# Patient Record
Sex: FEMALE | Race: BLACK OR AFRICAN AMERICAN | Employment: FULL TIME | URBAN - METROPOLITAN AREA
[De-identification: names, ages, dates, MRNs, and addresses within clinical notes are randomized per-mention and may not be internally consistent; named-entity substitution may affect disease eponyms.]

---

## 2018-10-24 ENCOUNTER — HOSPITAL ENCOUNTER (EMERGENCY)
Age: 23
Discharge: HOME | End: 2018-10-25

## 2018-10-24 VITALS
SYSTOLIC BLOOD PRESSURE: 129 MMHG | WEIGHT: 103 LBS | BODY MASS INDEX: 18.25 KG/M2 | RESPIRATION RATE: 16 BRPM | TEMPERATURE: 98.6 F | OXYGEN SATURATION: 100 % | DIASTOLIC BLOOD PRESSURE: 78 MMHG | HEART RATE: 88 BPM | HEIGHT: 63 IN

## 2018-10-24 DIAGNOSIS — J06.9 ACUTE UPPER RESPIRATORY INFECTION: Primary | ICD-10-CM

## 2018-10-24 PROCEDURE — 99282 EMERGENCY DEPT VISIT SF MDM: CPT

## 2018-10-24 RX ORDER — ACETAMINOPHEN 500 MG
500 TABLET ORAL EVERY 6 HOURS PRN
Qty: 120 TABLET | Refills: 3 | Status: SHIPPED | OUTPATIENT
Start: 2018-10-24 | End: 2021-07-02

## 2018-10-24 RX ORDER — FLUTICASONE PROPIONATE 50 MCG
1 SPRAY, SUSPENSION (ML) NASAL 2 TIMES DAILY
Qty: 1 BOTTLE | Refills: 0 | Status: SHIPPED | OUTPATIENT
Start: 2018-10-24 | End: 2019-02-17 | Stop reason: ALTCHOICE

## 2018-10-24 RX ORDER — IBUPROFEN 600 MG/1
600 TABLET ORAL EVERY 6 HOURS PRN
Qty: 120 TABLET | Refills: 3 | Status: SHIPPED | OUTPATIENT
Start: 2018-10-24 | End: 2019-02-17 | Stop reason: ALTCHOICE

## 2018-10-24 RX ORDER — GUAIFENESIN/DEXTROMETHORPHAN 100-10MG/5
5 SYRUP ORAL 3 TIMES DAILY PRN
Qty: 120 ML | Refills: 0 | Status: SHIPPED | OUTPATIENT
Start: 2018-10-24 | End: 2018-11-03

## 2018-10-24 ASSESSMENT — PAIN DESCRIPTION - DESCRIPTORS: DESCRIPTORS: SHARP

## 2018-10-24 ASSESSMENT — PAIN SCALES - GENERAL: PAINLEVEL_OUTOF10: 5

## 2018-10-24 ASSESSMENT — PAIN DESCRIPTION - LOCATION: LOCATION: THROAT

## 2018-10-24 ASSESSMENT — PAIN DESCRIPTION - PAIN TYPE: TYPE: ACUTE PAIN

## 2018-10-24 ASSESSMENT — PAIN DESCRIPTION - FREQUENCY: FREQUENCY: INTERMITTENT

## 2018-10-25 ASSESSMENT — ENCOUNTER SYMPTOMS
VOMITING: 0
RHINORRHEA: 1
VOICE CHANGE: 1
SHORTNESS OF BREATH: 0
TROUBLE SWALLOWING: 0
SORE THROAT: 1

## 2018-10-25 NOTE — ED PROVIDER NOTES
pooling of secretions. Eyes: Conjunctivae are normal. Right eye exhibits no discharge. Left eye exhibits no discharge. Neck: Neck supple. No nuchal rigidity. Cardiovascular: Normal rate, regular rhythm and normal heart sounds. Pulmonary/Chest: Effort normal and breath sounds normal. No respiratory distress. She has no wheezes. She has no rales. Speaking in full sentences without difficulty. No drooling or stridor. Abdominal: Soft. Musculoskeletal: She exhibits no edema. No LE edema. Lymphadenopathy:     She has no cervical adenopathy. Neurological: She is alert and oriented to person, place, and time. Skin: Skin is warm and dry. Psychiatric: She has a normal mood and affect. Her behavior is normal.   Nursing note and vitals reviewed. Diagnostic Results     RADIOLOGY:  No orders to display     LABS:   No results found for this visit on 10/24/18. RECENT VITALS:  BP: 129/78, Temp: 98.6 °F (37 °C), Pulse: 88, Resp: 16, SpO2: 100 %     Procedures     None    ED Course     Nursing Notes, Past Medical Hx,Past Surgical Hx, Social Hx, Allergies, and Family Hx were reviewed.     The patient was given the following medications:  Orders Placed This Encounter   Medications    acetaminophen (APAP EXTRA STRENGTH) 500 MG tablet     Sig: Take 1 tablet by mouth every 6 hours as needed for Pain     Dispense:  120 tablet     Refill:  3    ibuprofen (ADVIL;MOTRIN) 600 MG tablet     Sig: Take 1 tablet by mouth every 6 hours as needed for Pain     Dispense:  120 tablet     Refill:  3    fluticasone (FLONASE) 50 MCG/ACT nasal spray     Si spray by Nasal route 2 times daily     Dispense:  1 Bottle     Refill:  0    guaiFENesin-dextromethorphan (ROBITUSSIN DM) 100-10 MG/5ML syrup     Sig: Take 5 mLs by mouth 3 times daily as needed for Cough     Dispense:  120 mL     Refill:  0     CONSULTS:  None    MEDICAL DECISION MAKING / ASSESSMENT / Danilo Carmen is a 26 y.o. female here for sore

## 2018-10-25 NOTE — ED NOTES
Pt presents to the ED from home with c/o cough and sore throat since Saturday, pt denies any sob, chest pain. Pt denies taking any otc medications.       Marika Mariscal RN  10/25/18 5030

## 2019-02-17 ENCOUNTER — HOSPITAL ENCOUNTER (EMERGENCY)
Age: 24
Discharge: HOME | End: 2019-02-17
Attending: EMERGENCY MEDICINE
Payer: MEDICARE

## 2019-02-17 VITALS
OXYGEN SATURATION: 100 % | WEIGHT: 115 LBS | TEMPERATURE: 98.6 F | HEIGHT: 64 IN | HEART RATE: 106 BPM | BODY MASS INDEX: 19.63 KG/M2 | DIASTOLIC BLOOD PRESSURE: 66 MMHG | SYSTOLIC BLOOD PRESSURE: 108 MMHG | RESPIRATION RATE: 12 BRPM

## 2019-02-17 DIAGNOSIS — O99.891 ASYMPTOMATIC BACTERIURIA DURING PREGNANCY IN FIRST TRIMESTER: ICD-10-CM

## 2019-02-17 DIAGNOSIS — R82.71 ASYMPTOMATIC BACTERIURIA DURING PREGNANCY IN FIRST TRIMESTER: ICD-10-CM

## 2019-02-17 DIAGNOSIS — A59.03 TRICHOMONIASIS OF BLADDER: ICD-10-CM

## 2019-02-17 DIAGNOSIS — Z34.90 PREGNANCY, UNSPECIFIED GESTATIONAL AGE: ICD-10-CM

## 2019-02-17 DIAGNOSIS — M54.50 ACUTE RIGHT-SIDED LOW BACK PAIN WITHOUT SCIATICA: Primary | ICD-10-CM

## 2019-02-17 LAB
BACTERIA: ABNORMAL /HPF
BILIRUBIN URINE: NEGATIVE
BLOOD, URINE: NEGATIVE
CLARITY: CLEAR
COLOR: ABNORMAL
EPITHELIAL CELLS, UA: ABNORMAL /HPF
GLUCOSE URINE: NEGATIVE MG/DL
KETONES, URINE: NEGATIVE MG/DL
LEUKOCYTE ESTERASE, URINE: ABNORMAL
MICROSCOPIC EXAMINATION: YES
MUCUS: ABNORMAL /LPF
NITRITE, URINE: NEGATIVE
PH UA: 7
PROTEIN UA: NEGATIVE MG/DL
RBC UA: ABNORMAL /HPF (ref 0–2)
SPECIFIC GRAVITY UA: 1.01
TRICHOMONAS: PRESENT /HPF
URINE TYPE: ABNORMAL
UROBILINOGEN, URINE: >=8 E.U./DL
WBC UA: ABNORMAL /HPF (ref 0–5)

## 2019-02-17 PROCEDURE — 81001 URINALYSIS AUTO W/SCOPE: CPT

## 2019-02-17 PROCEDURE — 99283 EMERGENCY DEPT VISIT LOW MDM: CPT

## 2019-02-17 PROCEDURE — 6370000000 HC RX 637 (ALT 250 FOR IP): Performed by: EMERGENCY MEDICINE

## 2019-02-17 RX ORDER — ONDANSETRON 4 MG/1
4 TABLET, ORALLY DISINTEGRATING ORAL ONCE
Status: COMPLETED | OUTPATIENT
Start: 2019-02-17 | End: 2019-02-17

## 2019-02-17 RX ORDER — CEPHALEXIN 500 MG/1
500 CAPSULE ORAL 2 TIMES DAILY
Qty: 10 CAPSULE | Refills: 0 | Status: SHIPPED | OUTPATIENT
Start: 2019-02-17 | End: 2019-02-22

## 2019-02-17 RX ORDER — METRONIDAZOLE 500 MG/1
2000 TABLET ORAL ONCE
Status: COMPLETED | OUTPATIENT
Start: 2019-02-17 | End: 2019-02-17

## 2019-02-17 RX ORDER — PNV NO.95/FERROUS FUM/FOLIC AC 28MG-0.8MG
1 TABLET ORAL DAILY
Qty: 30 TABLET | Refills: 0 | Status: SHIPPED | OUTPATIENT
Start: 2019-02-17 | End: 2021-07-02

## 2019-02-17 RX ADMIN — METRONIDAZOLE 2000 MG: 500 TABLET ORAL at 15:58

## 2019-02-17 RX ADMIN — ONDANSETRON 4 MG: 4 TABLET, ORALLY DISINTEGRATING ORAL at 15:58

## 2019-02-25 ENCOUNTER — HOSPITAL ENCOUNTER (EMERGENCY)
Age: 24
Discharge: HOME | End: 2019-02-25
Attending: EMERGENCY MEDICINE
Payer: MEDICARE

## 2019-02-25 VITALS
OXYGEN SATURATION: 100 % | HEIGHT: 64 IN | TEMPERATURE: 98.2 F | WEIGHT: 117 LBS | HEART RATE: 86 BPM | SYSTOLIC BLOOD PRESSURE: 104 MMHG | BODY MASS INDEX: 19.97 KG/M2 | DIASTOLIC BLOOD PRESSURE: 64 MMHG | RESPIRATION RATE: 16 BRPM

## 2019-02-25 DIAGNOSIS — B37.31 YEAST VAGINITIS: Primary | ICD-10-CM

## 2019-02-25 DIAGNOSIS — Z34.91 FIRST TRIMESTER PREGNANCY: ICD-10-CM

## 2019-02-25 DIAGNOSIS — N30.00 ACUTE CYSTITIS WITHOUT HEMATURIA: ICD-10-CM

## 2019-02-25 LAB
BACTERIA WET PREP: ABNORMAL
BACTERIA: ABNORMAL /HPF
BILIRUBIN URINE: NEGATIVE
BLOOD, URINE: ABNORMAL
CLARITY: ABNORMAL
CLUE CELLS: ABNORMAL
COLOR: YELLOW
EPITHELIAL CELLS WET PREP: ABNORMAL
EPITHELIAL CELLS, UA: ABNORMAL /HPF
GLUCOSE URINE: NEGATIVE MG/DL
HCG(URINE) PREGNANCY TEST: POSITIVE
KETONES, URINE: NEGATIVE MG/DL
LEUKOCYTE ESTERASE, URINE: ABNORMAL
MICROSCOPIC EXAMINATION: YES
NITRITE, URINE: NEGATIVE
PH UA: 7
PROTEIN UA: NEGATIVE MG/DL
RBC UA: ABNORMAL /HPF (ref 0–2)
RBC WET PREP: ABNORMAL
SOURCE WET PREP: ABNORMAL
SPECIFIC GRAVITY UA: 1.01
TRICHOMONAS PREP: ABNORMAL
URINE TYPE: ABNORMAL
UROBILINOGEN, URINE: 2 E.U./DL
WBC UA: ABNORMAL /HPF (ref 0–5)
WBC WET PREP: ABNORMAL
YEAST WET PREP: ABNORMAL
YEAST: PRESENT /HPF

## 2019-02-25 PROCEDURE — 81001 URINALYSIS AUTO W/SCOPE: CPT

## 2019-02-25 PROCEDURE — 99283 EMERGENCY DEPT VISIT LOW MDM: CPT

## 2019-02-25 PROCEDURE — 84703 CHORIONIC GONADOTROPIN ASSAY: CPT

## 2019-02-25 PROCEDURE — 87210 SMEAR WET MOUNT SALINE/INK: CPT

## 2019-02-25 PROCEDURE — 87491 CHLMYD TRACH DNA AMP PROBE: CPT

## 2019-02-25 PROCEDURE — 87086 URINE CULTURE/COLONY COUNT: CPT

## 2019-02-25 PROCEDURE — 87591 N.GONORRHOEAE DNA AMP PROB: CPT

## 2019-02-25 RX ORDER — CLOTRIMAZOLE 1 %
CREAM WITH APPLICATOR VAGINAL
Qty: 45 G | Refills: 1 | Status: SHIPPED | OUTPATIENT
Start: 2019-02-25 | End: 2019-03-04

## 2019-02-25 RX ORDER — NITROFURANTOIN 25; 75 MG/1; MG/1
100 CAPSULE ORAL 2 TIMES DAILY
Qty: 10 CAPSULE | Refills: 0 | Status: SHIPPED | OUTPATIENT
Start: 2019-02-25 | End: 2019-03-02

## 2019-02-25 ASSESSMENT — PAIN DESCRIPTION - DESCRIPTORS: DESCRIPTORS: BURNING;ITCHING

## 2019-02-25 ASSESSMENT — PAIN SCALES - GENERAL: PAINLEVEL_OUTOF10: 10

## 2019-02-25 ASSESSMENT — PAIN DESCRIPTION - PAIN TYPE: TYPE: ACUTE PAIN

## 2019-02-25 ASSESSMENT — PAIN DESCRIPTION - LOCATION: LOCATION: VAGINA

## 2019-02-26 LAB
C TRACH DNA GENITAL QL NAA+PROBE: NEGATIVE
N. GONORRHOEAE DNA: POSITIVE
URINE CULTURE, ROUTINE: NORMAL

## 2020-07-16 ENCOUNTER — HOSPITAL ENCOUNTER (EMERGENCY)
Age: 25
Discharge: HOME | End: 2020-07-16
Payer: MEDICARE

## 2020-07-16 VITALS
OXYGEN SATURATION: 99 % | HEART RATE: 81 BPM | RESPIRATION RATE: 18 BRPM | TEMPERATURE: 97.7 F | DIASTOLIC BLOOD PRESSURE: 76 MMHG | SYSTOLIC BLOOD PRESSURE: 104 MMHG

## 2020-07-16 LAB
BACTERIA WET PREP: ABNORMAL
BILIRUBIN URINE: NEGATIVE
BLOOD, URINE: NEGATIVE
CLARITY: CLEAR
CLUE CELLS: ABNORMAL
COLOR: YELLOW
EPITHELIAL CELLS WET PREP: ABNORMAL
GLUCOSE URINE: NEGATIVE MG/DL
HCG(URINE) PREGNANCY TEST: NEGATIVE
KETONES, URINE: NEGATIVE MG/DL
LEUKOCYTE ESTERASE, URINE: NEGATIVE
MICROSCOPIC EXAMINATION: NORMAL
NITRITE, URINE: NEGATIVE
PH UA: 7 (ref 5–8)
PROTEIN UA: NEGATIVE MG/DL
RBC WET PREP: ABNORMAL
SOURCE WET PREP: ABNORMAL
SPECIFIC GRAVITY UA: 1.02 (ref 1–1.03)
TRICHOMONAS PREP: ABNORMAL
URINE REFLEX TO CULTURE: NORMAL
URINE TYPE: NORMAL
UROBILINOGEN, URINE: 1 E.U./DL
WBC WET PREP: ABNORMAL
YEAST WET PREP: ABNORMAL

## 2020-07-16 PROCEDURE — 87210 SMEAR WET MOUNT SALINE/INK: CPT

## 2020-07-16 PROCEDURE — 87591 N.GONORRHOEAE DNA AMP PROB: CPT

## 2020-07-16 PROCEDURE — 99283 EMERGENCY DEPT VISIT LOW MDM: CPT

## 2020-07-16 PROCEDURE — 81003 URINALYSIS AUTO W/O SCOPE: CPT

## 2020-07-16 PROCEDURE — 87491 CHLMYD TRACH DNA AMP PROBE: CPT

## 2020-07-16 PROCEDURE — 84703 CHORIONIC GONADOTROPIN ASSAY: CPT

## 2020-07-16 RX ORDER — METRONIDAZOLE 500 MG/1
500 TABLET ORAL 2 TIMES DAILY
Qty: 14 TABLET | Refills: 0 | Status: SHIPPED | OUTPATIENT
Start: 2020-07-16 | End: 2020-07-23

## 2020-07-16 ASSESSMENT — ENCOUNTER SYMPTOMS
RESPIRATORY NEGATIVE: 1
GASTROINTESTINAL NEGATIVE: 1

## 2020-07-16 ASSESSMENT — PAIN DESCRIPTION - DESCRIPTORS: DESCRIPTORS: CRAMPING

## 2020-07-16 ASSESSMENT — PAIN DESCRIPTION - ONSET: ONSET: ON-GOING

## 2020-07-16 ASSESSMENT — PAIN SCALES - WONG BAKER: WONGBAKER_NUMERICALRESPONSE: 8

## 2020-07-16 ASSESSMENT — PAIN DESCRIPTION - LOCATION: LOCATION: ABDOMEN

## 2020-07-16 ASSESSMENT — PAIN - FUNCTIONAL ASSESSMENT: PAIN_FUNCTIONAL_ASSESSMENT: ACTIVITIES ARE NOT PREVENTED

## 2020-07-16 ASSESSMENT — PAIN DESCRIPTION - PROGRESSION: CLINICAL_PROGRESSION: NOT CHANGED

## 2020-07-16 ASSESSMENT — PAIN DESCRIPTION - FREQUENCY: FREQUENCY: INTERMITTENT

## 2020-07-16 ASSESSMENT — PAIN DESCRIPTION - PAIN TYPE: TYPE: ACUTE PAIN

## 2020-07-16 ASSESSMENT — PAIN DESCRIPTION - ORIENTATION: ORIENTATION: MID

## 2020-07-16 ASSESSMENT — PAIN SCALES - GENERAL: PAINLEVEL_OUTOF10: 7

## 2020-07-16 NOTE — ED PROVIDER NOTES
[facility]  eMERGENCY dEPARTMENT eNCOUnter    Pt Name: @@  MRN: [id number]  Birthdate1995  Date of evaluation: 7/16/2020  Provider: DAO Cruz CNP     Evaluated by the advanced practice provider    CHIEF COMPLAINT       Chief Complaint   Patient presents with    Urinary Tract Infection         HISTORY OF PRESENT ILLNESS  (Location/Symptom, Timing/Onset, Context/Setting, Quality, Duration, Modifying Factors, Severity.)   Mark Charlton is a 22 y.o. female who presents to the emergencydepartment complains of suprapubic pressure. Denies unusual vaginal discharge. Denies hematuria or dysuria. Symptoms have been present for several days. Last menstrual period last month. Last pelvic exam a year ago while pregnant. Nursing Notes were reviewed and I agree. REVIEW OF SYSTEMS    (2-9 systems for level 4, 10 or more for level 5)     Review of Systems   Constitutional: Negative. HENT: Negative. Respiratory: Negative. Cardiovascular: Negative. Gastrointestinal: Negative. Genitourinary: Negative. Musculoskeletal: Negative. Neurological: Negative. All other systems reviewed and are negative. Except as noted above the remainder of the review of systems was reviewed and negative. PAST MEDICAL HISTORY   History reviewed. No pertinent past medical history. SURGICAL HISTORY           Procedure Laterality Date    CHOLECYSTECTOMY         CURRENT MEDICATIONS       Discharge Medication List as of 7/16/2020  8:32 PM      CONTINUE these medications which have NOT CHANGED    Details   Prenatal Vit-Fe Fumarate-FA (PRENATAL VITAMIN) 27-0.8 MG TABS Take 1 tablet by mouth daily, Disp-30 tablet, R-0Print      acetaminophen (APAP EXTRA STRENGTH) 500 MG tablet Take 1 tablet by mouth every 6 hours as needed for Pain, Disp-120 tablet, R-3Print             ALLERGIES     Patient has no known allergies. FAMILY HISTORY     History reviewed. No pertinent family history. No family status information on file. SOCIAL HISTORY      reports that she has never smoked. She has never used smokeless tobacco. She reports that she does not drink alcohol or use drugs. PHYSICAL EXAM    (up to 7 for level 4, 8 or more for level 5)      ED Triage Vitals [07/16/20 1749]   BP Temp Temp Source Pulse Resp SpO2 Height Weight   104/76 97.7 °F (36.5 °C) Oral 81 18 99 % -- --       Physical Exam  Vitals signs and nursing note reviewed. Constitutional:       General: She is not in acute distress. Appearance: She is not ill-appearing, toxic-appearing or diaphoretic. Cardiovascular:      Rate and Rhythm: Normal rate. Pulses: Normal pulses. Pulmonary:      Effort: Pulmonary effort is normal.   Abdominal:      General: Bowel sounds are normal.      Palpations: Abdomen is soft. Tenderness: There is no abdominal tenderness. There is no guarding or rebound. Hernia: No hernia is present. Genitourinary:     Comments: Bedside speculum and digital pelvic exam performed. Vikki Talbot RN chaperone. External genitalia unremarkable. No bumps lumps or lesions. No pain. Cervix closed and nontender. No adnexal tenderness or fullness. Moderate amount of white thin vaginal discharge noted. No bumps lumps or lesions. Skin:     General: Skin is warm and dry. Capillary Refill: Capillary refill takes less than 2 seconds. Neurological:      General: No focal deficit present. Mental Status: She is alert.            DIAGNOSTIC RESULTS     RADIOLOGY:   Non-plain film images such as CT, Ultrasound and MRI are read by the radiologist. Plain radiographic images are visualized and preliminarily interpreted by DAO Dangelo CNP with the below findings:        Interpretation per the Radiologist below, if available at the time of this note:    No orders to display       LABS:  Labs Reviewed   WET PREP, GENITAL - Abnormal; Notable for the following components:       Result Value    Clue Cells, Wet Prep 1+ (*)     All other components within normal limits    Narrative:     Performed at:  [facility]  [address] [place] [address]   Phone [phone]   C. TRACHOMATIS N.GONORRHOEAE DNA   URINE RT REFLEX TO CULTURE    Narrative:     Performed at:  [facility]  [address] [facility] [place] [address]   Phone [phone]   PREGNANCY, URINE    Narrative:     Performed at:  [facility]  [address] [place] [address]   Phone [phone]       All other labs were within normal range or not returned as of this dictation. EMERGENCY DEPARTMENT COURSE and DIFFERENTIAL DIAGNOSIS/MDM:   Vitals:    Vitals:    07/16/20 1749   BP: 104/76   Pulse: 81   Resp: 18   Temp: 97.7 °F (36.5 °C)   TempSrc: Oral   SpO2: 99%     Medications - No data to display    MDM  Patient was seen and evaluated per myself. Dr. Susan Rios was present available for consultation as needed    Differential diagnosis: UTI, pregnancy, STI, yeast, bacterial vaginosis    On clinical exam patient is hemodynamically stable. Afebrile. No evidence of an acute abdomen. Resting comfortably. No evidence of acute illness. No CVA tenderness. No fever. No hypotension. Her  exam yielded no cervical motion tenderness adnexal tenderness or abdominal tenderness. There is white discharge noted in the vaginal vault. Urinalysis is negative. Urine pregnancy is negative. Wet prep result: 1+ clue cells consistent with bacterial vaginosis. No evidence of trichomonas or yeast.  Gonorrhea and Chlamydia specimen in process. I have a low index of suspicion as well. No indication for empiric treatment. Patient was in agreement. Patient will be treated with Flagyl for bacterial vaginosis and she is referred to her primary care for follow-up.   Naturally she was encouraged to return to

## 2020-07-17 LAB
C TRACH DNA GENITAL QL NAA+PROBE: NEGATIVE
N. GONORRHOEAE DNA: NEGATIVE

## 2020-07-17 NOTE — ED NOTES
AVS reviewed with pt who verbalized understanding of f/u care and dc instructions.  Pt stable for dc     Alexsandra Nuñez RN  07/16/20 2045

## 2020-11-06 ENCOUNTER — HOSPITAL ENCOUNTER (EMERGENCY)
Age: 25
Discharge: HOME | End: 2020-11-06
Attending: EMERGENCY MEDICINE
Payer: MEDICARE

## 2020-11-06 VITALS
RESPIRATION RATE: 16 BRPM | DIASTOLIC BLOOD PRESSURE: 67 MMHG | WEIGHT: 140 LBS | BODY MASS INDEX: 24.03 KG/M2 | OXYGEN SATURATION: 97 % | SYSTOLIC BLOOD PRESSURE: 115 MMHG | HEART RATE: 88 BPM | TEMPERATURE: 98.2 F

## 2020-11-06 LAB
BACTERIA WET PREP: ABNORMAL
BILIRUBIN URINE: NEGATIVE
BLOOD, URINE: NEGATIVE
CLARITY: CLEAR
CLUE CELLS: ABNORMAL
COLOR: YELLOW
EPITHELIAL CELLS WET PREP: ABNORMAL
GLUCOSE URINE: NEGATIVE MG/DL
HCG(URINE) PREGNANCY TEST: POSITIVE
KETONES, URINE: NEGATIVE MG/DL
LEUKOCYTE ESTERASE, URINE: NEGATIVE
MICROSCOPIC EXAMINATION: ABNORMAL
NITRITE, URINE: NEGATIVE
PH UA: 6.5 (ref 5–8)
PROTEIN UA: NEGATIVE MG/DL
RBC WET PREP: ABNORMAL
SOURCE WET PREP: ABNORMAL
SPECIFIC GRAVITY UA: 1.02 (ref 1–1.03)
TRICHOMONAS PREP: ABNORMAL
URINE REFLEX TO CULTURE: ABNORMAL
URINE TYPE: ABNORMAL
UROBILINOGEN, URINE: 2 E.U./DL
WBC WET PREP: ABNORMAL
YEAST WET PREP: ABNORMAL

## 2020-11-06 PROCEDURE — 87591 N.GONORRHOEAE DNA AMP PROB: CPT

## 2020-11-06 PROCEDURE — 81003 URINALYSIS AUTO W/O SCOPE: CPT

## 2020-11-06 PROCEDURE — 87491 CHLMYD TRACH DNA AMP PROBE: CPT

## 2020-11-06 PROCEDURE — 99283 EMERGENCY DEPT VISIT LOW MDM: CPT

## 2020-11-06 PROCEDURE — 87210 SMEAR WET MOUNT SALINE/INK: CPT

## 2020-11-06 PROCEDURE — 84703 CHORIONIC GONADOTROPIN ASSAY: CPT

## 2020-11-06 RX ORDER — PNV NO.95/FERROUS FUM/FOLIC AC 28MG-0.8MG
1 TABLET ORAL DAILY
Qty: 30 TABLET | Refills: 0 | Status: SHIPPED | OUTPATIENT
Start: 2020-11-06 | End: 2021-07-02

## 2020-11-06 RX ORDER — METRONIDAZOLE 500 MG/1
500 TABLET ORAL 2 TIMES DAILY
Qty: 14 TABLET | Refills: 0 | Status: SHIPPED | OUTPATIENT
Start: 2020-11-06 | End: 2020-11-13

## 2020-11-06 ASSESSMENT — PAIN DESCRIPTION - DESCRIPTORS: DESCRIPTORS: CRAMPING

## 2020-11-06 ASSESSMENT — PAIN SCALES - GENERAL: PAINLEVEL_OUTOF10: 1

## 2020-11-06 ASSESSMENT — PAIN DESCRIPTION - LOCATION: LOCATION: ABDOMEN

## 2020-11-06 ASSESSMENT — PAIN DESCRIPTION - PAIN TYPE: TYPE: ACUTE PAIN

## 2020-11-07 NOTE — ED PROVIDER NOTES
[facility]  EMERGENCY DEPT VISIT      Patient Identification  Kiara Cruz is a 26 y.o. female. Chief Complaint   Abdominal Pain and Flank Pain      History of Present Illness: This is a  26 y.o. female who presents ambulatory  to the ED with complaints of one day h/o lower abdominal cramping pain. Pain comes and goes and is not hurting at this time. No fever. No nausea or vomiting. No diarrhea. No dysuria. No vaginal bleeding. No abnormal vaginal discharge. Last menstrual period was late July. She stopped her birth control patch in June. She took a pregnancy test last month and it was positive but she has not followed up. Later this afternoon she had a few minutes or tingling right flank discomfort which has since resolved. No past medical history on file. Past Surgical History:   Procedure Laterality Date    CHOLECYSTECTOMY         No current facility-administered medications for this encounter.      Current Outpatient Medications:     metroNIDAZOLE (FLAGYL) 500 MG tablet, Take 1 tablet by mouth 2 times daily for 7 days, Disp: 14 tablet, Rfl: 0    Prenatal Vit-Fe Fumarate-FA (PRENATAL VITAMINS) 28-0.8 MG TABS, Take 1 tablet by mouth daily, Disp: 30 tablet, Rfl: 0    Prenatal Vit-Fe Fumarate-FA (PRENATAL VITAMIN) 27-0.8 MG TABS, Take 1 tablet by mouth daily, Disp: 30 tablet, Rfl: 0    acetaminophen (APAP EXTRA STRENGTH) 500 MG tablet, Take 1 tablet by mouth every 6 hours as needed for Pain, Disp: 120 tablet, Rfl: 3    No Known Allergies    Social History     Socioeconomic History    Marital status: Single     Spouse name: Not on file    Number of children: Not on file    Years of education: Not on file    Highest education level: Not on file   Occupational History    Not on file   Social Needs    Financial resource strain: Not on file    Food insecurity     Worry: Not on file     Inability: Not on file    Transportation needs     Medical: Not on file     Non-medical: Not on file   Tobacco Use  Smoking status: Never Smoker    Smokeless tobacco: Never Used   Substance and Sexual Activity    Alcohol use: No    Drug use: No    Sexual activity: Yes     Partners: Male   Lifestyle    Physical activity     Days per week: Not on file     Minutes per session: Not on file    Stress: Not on file   Relationships    Social connections     Talks on phone: Not on file     Gets together: Not on file     Attends Orthodox service: Not on file     Active member of club or organization: Not on file     Attends meetings of clubs or organizations: Not on file     Relationship status: Not on file    Intimate partner violence     Fear of current or ex partner: Not on file     Emotionally abused: Not on file     Physically abused: Not on file     Forced sexual activity: Not on file   Other Topics Concern    Not on file   Social History Narrative    Not on file       Nursing Notes Reviewed      ROS:  GENERAL:  No fever, no chills, no diaphoresis, no appetite changes  EYES: no eye discharge, no eye redness, no visual changes  ENT: no nasal congestion, no sore throat  CARDIAC: no chest pain,  no leg swelling  PULM: no cough, no shortness of breath  ABD: + abdominal pain, no nausea, no vomiting, no diarrhea, no melena or hematochezia  : no dysuria, no hematuria, no urgency, no frequency. + flank pain  MUSCULOSKELETAL: no back pain, no arthralgias, no myalgias  NEURO: no headache, no lightheadedness, no dizziness  SKIN: no rashes, no erythema, no wounds, no ecchymosis      PHYSICAL EXAM:  GENERAL APPEARANCE: Richy Cleary is in no acute respiratory distress. Awake and alert. VITAL SIGNS:   ED Triage Vitals [11/06/20 2111]   Enc Vitals Group      /67      Pulse 88      Resp 16      Temp 98.2 °F (36.8 °C)      Temp Source Oral      SpO2 97 %      Weight 140 lb (63.5 kg)      Height       Head Circumference       Peak Flow       Pain Score       Pain Loc       Pain Edu? Excl. in [address]?       HEAD: Normocephalic, Abdominal pain during pregnancy in second trimester    2. Bacterial vaginosis in pregnancy        Dispo:  Patient will be discharged  at this time. Patient was informed of this decision and agrees with plan. I have discussed lab and xray findings with patient and they understand. Questions were answered to the best of my ability. Discharge vitals:  Blood pressure 115/67, pulse 88, temperature 98.2 °F (36.8 °C), temperature source Oral, resp. rate 16, weight 140 lb (63.5 kg), SpO2 97 %, unknown if currently breastfeeding. Prescriptions given:   Discharge Medication List as of 11/6/2020 10:28 PM      START taking these medications    Details   metroNIDAZOLE (FLAGYL) 500 MG tablet Take 1 tablet by mouth 2 times daily for 7 days, Disp-14 tablet,R-0Print      Prenatal Vit-Fe Fumarate-FA (PRENATAL VITAMINS) 28-0.8 MG TABS Take 1 tablet by mouth daily, Disp-30 tablet,R-0Print             This chart was created using Dragon voice recognition software.         Ashley Montanez MD  11/07/20 5374

## 2020-11-09 LAB
C TRACH DNA GENITAL QL NAA+PROBE: NEGATIVE
N. GONORRHOEAE DNA: NEGATIVE

## 2020-11-28 ENCOUNTER — APPOINTMENT (OUTPATIENT)
Dept: GENERAL RADIOLOGY | Age: 25
End: 2020-11-28
Payer: MEDICARE

## 2020-11-28 ENCOUNTER — HOSPITAL ENCOUNTER (EMERGENCY)
Age: 25
Discharge: HOME | End: 2020-11-28
Attending: EMERGENCY MEDICINE
Payer: MEDICARE

## 2020-11-28 VITALS
HEART RATE: 94 BPM | WEIGHT: 144 LBS | TEMPERATURE: 98.1 F | DIASTOLIC BLOOD PRESSURE: 65 MMHG | RESPIRATION RATE: 16 BRPM | OXYGEN SATURATION: 98 % | SYSTOLIC BLOOD PRESSURE: 112 MMHG | BODY MASS INDEX: 24.72 KG/M2

## 2020-11-28 PROCEDURE — 73630 X-RAY EXAM OF FOOT: CPT

## 2020-11-28 PROCEDURE — 99283 EMERGENCY DEPT VISIT LOW MDM: CPT

## 2020-11-28 ASSESSMENT — PAIN DESCRIPTION - PAIN TYPE: TYPE: ACUTE PAIN

## 2020-11-28 ASSESSMENT — PAIN DESCRIPTION - LOCATION: LOCATION: FOOT

## 2020-11-28 ASSESSMENT — PAIN DESCRIPTION - DESCRIPTORS: DESCRIPTORS: ACHING

## 2020-11-28 ASSESSMENT — PAIN DESCRIPTION - ORIENTATION: ORIENTATION: RIGHT

## 2020-11-28 ASSESSMENT — PAIN SCALES - GENERAL: PAINLEVEL_OUTOF10: 8

## 2020-11-28 NOTE — ED PROVIDER NOTES
[address]   eMERGENCY dEPARTMENT eNCOUnter      Pt Name: Massiel Callaway  MRN: [id number]  [place] 1995  Date of evaluation: 11/28/2020  Provider: Jose Cook MD  PCP: Michael Rivera MD      CHIEF COMPLAINT       Foot pain    HISTORY OFPRESENT ILLNESS   (Location/Symptom, Timing/Onset, Context/Setting, Quality, Duration, Modifying Factors,Severity)  Note limiting factors. Massiel Callaway is a 26 y.o. female presents with complaints of foot pain this started earlier this evening she does not recall dropping anything on it or twisting her ankle of any way she is about 14 weeks pregnant she has not been taking any medication for the pain she denies any ankle pain denies any Achilles tendon pain    Nursing Notes were all reviewed and agreed with or any disagreements were addressed  in the HPI. REVIEW OF SYSTEMS    (2-9 systems for level 4, 10 or more for level 5)     Review of Systems    Positives and Pertinent negatives as per HPI. Except as noted above in the ROS, all other systems were reviewed andnegative. PASTMEDICAL HISTORY   History reviewed. No pertinent past medical history. SURGICAL HISTORY       Past Surgical History:   Procedure Laterality Date    CHOLECYSTECTOMY           CURRENT MEDICATIONS       Previous Medications    ACETAMINOPHEN (APAP EXTRA STRENGTH) 500 MG TABLET    Take 1 tablet by mouth every 6 hours as needed for Pain    PRENATAL VIT-FE FUMARATE-FA (PRENATAL VITAMIN) 27-0.8 MG TABS    Take 1 tablet by mouth daily    PRENATAL VIT-FE FUMARATE-FA (PRENATAL VITAMINS) 28-0.8 MG TABS    Take 1 tablet by mouth daily       ALLERGIES     Patient has no known allergies. FAMILY HISTORY     History reviewed. No pertinent family history.        SOCIAL HISTORY       Social History     Socioeconomic History    Marital status: Single     Spouse name: None    Number of children: None    Years of education: None    Highest education level: None Occupational History    None   Social Needs    Financial resource strain: None    Food insecurity     Worry: None     Inability: None    Transportation needs     Medical: None     Non-medical: None   Tobacco Use    Smoking status: Never Smoker    Smokeless tobacco: Never Used   Substance and Sexual Activity    Alcohol use: No    Drug use: No    Sexual activity: Yes     Partners: Male   Lifestyle    Physical activity     Days per week: None     Minutes per session: None    Stress: None   Relationships    Social connections     Talks on phone: None     Gets together: None     Attends Rastafari service: None     Active member of club or organization: None     Attends meetings of clubs or organizations: None     Relationship status: None    Intimate partner violence     Fear of current or ex partner: None     Emotionally abused: None     Physically abused: None     Forced sexual activity: None   Other Topics Concern    None   Social History Narrative    None       SCREENINGS      @FLOW([id number])@      PHYSICAL EXAM    (up to 7 for level 4, 8 or more for level 5)     ED Triage Vitals [11/28/20 0016]   BP Temp Temp Source Pulse Resp SpO2 Height Weight   112/65 98.1 °F (36.7 °C) Oral 94 16 98 % -- 144 lb (65.3 kg)       Physical Exam      General Appearance:  Alert, cooperative, no distress, appears stated age. Head:  Normocephalic, without obviousabnormality, atraumatic. Eyes:  conjunctiva/corneas clear, EOM's intact. Sclera anicteric. ENT: Mucous membranes moist.   Neck: Supple, symmetrical, trachea midline, no adenopathy. No jugular venous distention. Lungs:   Clear to auscultation bilaterally, respirationsunlabored. No rales, rhonchi or wheezes. Chest Wall:  No tenderness. Heart:  Regular rate and rhythm, S1 and S2 normal, no murmur, rub or gallop. Abdomen:   Soft, non-tender, bowel sounds active,   no masses, no organomegaly. Extremities: No edema, cords or calf tenderness.   Full range of motion. There is some mild erythema to the anterior foot but there is no warmth no swelling minimal tenderness on palpation   Pulses: 2+ and symmetric   Skin: Turgor is normal, no rashes or lesions. Neurologic: Alert and oriented X 3. No focal findings. Motor grossly normal.  Speech clear, no drift, CN III-XII grossly intact,        DIAGNOSTIC RESULTS   LABS:    Labs Reviewed - No data to display    All other labs were within normal range or not returned as of this dictation. EKG: All EKG's are interpreted by the Emergency Department Physician who eithersigns or Co-signs this chart in the absence of a cardiologist.        RADIOLOGY:   Non-plain film images such as CT, Ultrasound and MRI are read by the radiologist. Plain radiographic images are visualized by myself. *    Interpretation per the Radiologist below, if available at the time of this note:    XR FOOT RIGHT (MIN 3 VIEWS)   Final Result     No acute fracture. PROCEDURES   Unless otherwise noted below, none     Procedures    *    CRITICAL CARE TIME   N/A      EMERGENCY DEPARTMENT COURSE and DIFFERENTIALDIAGNOSIS/MDM:   Vitals:    Vitals:    11/28/20 0016   BP: 112/65   Pulse: 94   Resp: 16   Temp: 98.1 °F (36.7 °C)   TempSrc: Oral   SpO2: 98%   Weight: 144 lb (65.3 kg)       Patient was given thefollowing medications:  Medications - No data to display        The patient tolerated their visit well. The patient and / or the familywere informed of the results of any tests, a time was given to answer questions. FINAL IMPRESSION      1.  Pain of foot, unspecified laterality          DISPOSITION/PLAN   DISPOSITION Decision To Discharge 11/28/2020 01:26:03 AM      PATIENT REFERRED TO:  Ailyn Jefferson MD  [address] [facility]            DISCHARGE MEDICATIONS:  New Prescriptions    No medications on file       DISCONTINUED MEDICATIONS:  Discontinued Medications    No medications on file              (Please note that portions of this note were completed with a voice recognition program.  Efforts were made to edit the dictations but occasionally words are mis-transcribed.)    Georgina Pham MD (electronically signed)      Georgina Pham MD  11/28/20 4777

## 2020-11-28 NOTE — ED TRIAGE NOTES
Patient c/o pain R foot for the past three days, denies injury, has been swollen per pt. Pt pregnant, LMP 7/2020. No obvious swelling, NVS intact.

## 2021-07-01 PROCEDURE — 84703 CHORIONIC GONADOTROPIN ASSAY: CPT

## 2021-07-01 PROCEDURE — 81001 URINALYSIS AUTO W/SCOPE: CPT

## 2021-07-02 ENCOUNTER — HOSPITAL ENCOUNTER (EMERGENCY)
Age: 26
Discharge: HOME | End: 2021-07-02
Payer: MEDICARE

## 2021-07-02 VITALS
TEMPERATURE: 98.1 F | HEART RATE: 62 BPM | RESPIRATION RATE: 18 BRPM | SYSTOLIC BLOOD PRESSURE: 117 MMHG | HEIGHT: 63 IN | BODY MASS INDEX: 31.6 KG/M2 | DIASTOLIC BLOOD PRESSURE: 64 MMHG | WEIGHT: 178.35 LBS | OXYGEN SATURATION: 97 %

## 2021-07-02 DIAGNOSIS — R30.0 DYSURIA: Primary | ICD-10-CM

## 2021-07-02 LAB
BACTERIA WET PREP: NORMAL
BACTERIA: ABNORMAL /HPF
BILIRUBIN URINE: NEGATIVE
BLOOD, URINE: ABNORMAL
CLARITY: ABNORMAL
CLUE CELLS: NORMAL
COLOR: ABNORMAL
EPITHELIAL CELLS WET PREP: NORMAL
EPITHELIAL CELLS, UA: 8 /HPF (ref 0–5)
GLUCOSE URINE: NEGATIVE MG/DL
HCG(URINE) PREGNANCY TEST: NEGATIVE
HYALINE CASTS: 0 /LPF (ref 0–8)
KETONES, URINE: ABNORMAL MG/DL
LEUKOCYTE ESTERASE, URINE: ABNORMAL
MICROSCOPIC EXAMINATION: YES
NITRITE, URINE: NEGATIVE
PH UA: 6 (ref 5–8)
PROTEIN UA: ABNORMAL MG/DL
RBC UA: >900 /HPF (ref 0–4)
RBC WET PREP: NORMAL
SOURCE WET PREP: NORMAL
SPECIFIC GRAVITY UA: 1.02 (ref 1–1.03)
TRICHOMONAS PREP: NORMAL
URINE REFLEX TO CULTURE: YES
URINE TYPE: ABNORMAL
UROBILINOGEN, URINE: 2 E.U./DL
WBC UA: 10 /HPF (ref 0–5)
WBC WET PREP: NORMAL
YEAST WET PREP: NORMAL

## 2021-07-02 PROCEDURE — 81001 URINALYSIS AUTO W/SCOPE: CPT

## 2021-07-02 PROCEDURE — 87591 N.GONORRHOEAE DNA AMP PROB: CPT

## 2021-07-02 PROCEDURE — 87491 CHLMYD TRACH DNA AMP PROBE: CPT

## 2021-07-02 PROCEDURE — 87186 SC STD MICRODIL/AGAR DIL: CPT

## 2021-07-02 PROCEDURE — 87077 CULTURE AEROBIC IDENTIFY: CPT

## 2021-07-02 PROCEDURE — 87086 URINE CULTURE/COLONY COUNT: CPT

## 2021-07-02 PROCEDURE — 84703 CHORIONIC GONADOTROPIN ASSAY: CPT

## 2021-07-02 PROCEDURE — 87210 SMEAR WET MOUNT SALINE/INK: CPT

## 2021-07-02 PROCEDURE — 99285 EMERGENCY DEPT VISIT HI MDM: CPT

## 2021-07-02 ASSESSMENT — ENCOUNTER SYMPTOMS
NAUSEA: 0
VOMITING: 0
ABDOMINAL PAIN: 0

## 2021-07-02 ASSESSMENT — PAIN SCALES - GENERAL: PAINLEVEL_OUTOF10: 0

## 2021-07-02 NOTE — ED PROVIDER NOTES
[address] [facility]        Pt Name: Kimmy Davalos  MRN: [id number]  [place] 1995  Date of evaluation: 7/2/2021  Provider: SATHYA De La Vega    Evaluation by Advanced Practice Provider. Supervising physician available for consultation. CHIEF COMPLAINT       Chief Complaint   Patient presents with    Dysuria     painful urination since yesterday. she is 2 mo PP vaginal delivery          HISTORY OF PRESENT ILLNESS  (Location/Symptom, Timing/Onset, Context/Setting, Quality, Duration, Modifying Factors, Severity.)   Kimmy Davalos is a 26 y.o. female 2mos PP who presents to the emergency department with 1day hx dysuria. She had her 6 week post partum follow up 1 week ago and diagnosed with BV, treated with Flagyl. Then  A few days later was treated for yeast infection with dose of diflucan. Since then her symptoms have been resolved. Since yesterday, she developed dysuria though. She is currently in the middle of her menstrual cycle. Denies current vaginal discharge. Denies frequency or urgency. denies sores or rashes in genital area. Denies fever chills nausea vomiting abdominal pain. She gave birth vaginally 6 weeks ago and there were no complications. Nursing Noteswere reviewed and I agree. REVIEW OF SYSTEMS    (2-9systems for level 4, 10 or more for level 5)     Review of Systems   Constitutional: Negative for chills and fever. Gastrointestinal: Negative for abdominal pain, nausea and vomiting. Genitourinary: Positive for dysuria. Negative for frequency, genital sores, urgency and vaginal discharge. Except as noted above the remainder of the review of systems was reviewedand negative. PAST MEDICAL HISTORY   History reviewed. No pertinent past medical history.     SURGICAL HISTORY           Procedure Laterality Date    CHOLECYSTECTOMY         CURRENT MEDICATIONS       Discharge Medication List as of 7/2/2021 1:21 PM          ALLERGIES     Patient has no known allergies. FAMILY HISTORY     History reviewed. No pertinent family history. No family status information on file. SOCIAL HISTORY      reports that she has never smoked. She has never used smokeless tobacco. She reports that she does not drink alcohol and does not use drugs. PHYSICAL EXAM    (up to 7 for level 4, 8 or more for level 5)     ED Triage Vitals [07/02/21 1049]   BP Temp Temp src Pulse Resp SpO2 Height Weight   117/64 98.1 °F (36.7 °C) -- 62 19 97 % 5' 3\" (1.6 m) 178 lb 5.6 oz (80.9 kg)       Physical Exam  Constitutional:       General: She is not in acute distress. Appearance: Normal appearance. She is not ill-appearing, toxic-appearing or diaphoretic. HENT:      Head: Normocephalic and atraumatic. Pulmonary:      Effort: Pulmonary effort is normal. No respiratory distress. Abdominal:      General: There is no distension. Palpations: Abdomen is soft. There is no mass. Tenderness: There is no abdominal tenderness. There is no guarding or rebound. Hernia: No hernia is present. Genitourinary:     Comments: Normal female external genitalia. Vaginal small amount dark red blood. Cervix normal.  Os closed. No cervical motion tenderness or bilateral adnexal tenderness    Shyam MCDONNELL student helped with and chaperoned exam  Musculoskeletal:         General: Normal range of motion. Cervical back: Normal range of motion and neck supple. Skin:     General: Skin is warm. Neurological:      Mental Status: She is alert. Psychiatric:         Mood and Affect: Mood normal.         Behavior: Behavior normal.         Thought Content:  Thought content normal.         Judgment: Judgment normal.         DIFFERENTIAL DIAGNOSIS   UTI, gonorrhea, chlamydia, HSV, syphilis, warts, BV, yeast      DIAGNOSTIC RESULTS       RADIOLOGY:   Non-plain film imagessuch as CT, Ultrasound and MRI are read by the radiologist. Jennifer Taylor radiographic images are visualized and preliminarily interpreted by SATHYA Ordonez withthe below findings:      Interpretation per the Radiologist below, if available at the time of this note:    No orders to display         LABS:  Results for orders placed or performed during the hospital encounter of 07/02/21   Wet prep, genital    Specimen: Vaginal   Result Value Ref Range    Trichomonas Prep None Seen     Yeast, Wet Prep None Seen     Clue Cells, Wet Prep None Seen     WBC, Wet Prep <1+     RBC, Wet Prep 3+     Epi Cells 1+     Bacteria <1+     Source Wet Prep Vaginal    Urinalysis Reflex to Culture    Specimen: Urine, clean catch   Result Value Ref Range    Color, UA RED (A) Straw/Yellow    Clarity, UA CLOUDY (A) Clear    Glucose, Ur Negative Negative mg/dL    Bilirubin Urine Negative Negative    Ketones, Urine TRACE (A) Negative mg/dL    Specific Gravity, UA 1.019 1.005 - 1.030    Blood, Urine LARGE (A) Negative    pH, UA 6.0 5.0 - 8.0    Protein, UA TRACE (A) Negative mg/dL    Urobilinogen, Urine 2.0 (A) <2.0 E.U./dL    Nitrite, Urine Negative Negative    Leukocyte Esterase, Urine SMALL (A) Negative    Microscopic Examination YES     Urine Type NotGiven     Urine Reflex to Culture Yes    Pregnancy, Urine   Result Value Ref Range    HCG(Urine) Pregnancy Test Negative Detects HCG level >20 MIU/mL   Microscopic Urinalysis   Result Value Ref Range    Bacteria, UA RARE (A) None Seen /HPF    Hyaline Casts, UA 0 0 - 8 /LPF    WBC, UA 10 (H) 0 - 5 /HPF    RBC, UA >900 (H) 0 - 4 /HPF    Epithelial Cells, UA 8 (H) 0 - 5 /HPF       All other labs werewithin normal range or not returned as of this dictation.     EMERGENCY DEPARTMENT COURSE and DIFFERENTIAL DIAGNOSIS/MDM:   Vitals:    Vitals:    07/02/21 1049 07/02/21 1323   BP: 117/64    Pulse: 62    Resp: 19 18   Temp: 98.1 °F (36.7 °C)    SpO2: 97%    Weight: 178 lb 5.6 oz (80.9 kg)    Height: 5' 3\" (1.6 m)        Patient was nontoxic, well appearing, afebrile with normal vital signs. Saturating well on room air. Urinalysis is contaminated with her vaginal bleeding. Pregnancy negative. Wet prep negative. Unclear the etiology of her dysuria. Urine does not appear infected microscopic with rare bacteria, 10 whites but eight epis and greater than 900 reds. Discussed with patient to follow-up with her gynecologist.  She reports she already has an appointment on Thursday. Discussed to follow-up then for reevaluation return for worsening. Agreed understood. PROCEDURES:  None    FINAL IMPRESSION      1.  Dysuria          DISPOSITION/PLAN   DISPOSITION Decision To Discharge 07/02/2021 01:01:52 PM      PATIENTREFERRED TO:  Your GYN    Schedule an appointment as soon as possible for a visit in 1 week  for reevaluation    [facility] Emergency Department  [address] [facility]  [phone]    As needed, If symptoms worsen      DISCHARGEMEDICATIONS:  Discharge Medication List as of 7/2/2021  1:21 PM          (Please note that portions of this note were completed with a voice recognition program.  Efforts were made to edit the dictations but occasionally words aremis-transcribed.)    Darreld Ahumada, [address] [address]  07/02/21 6686

## 2021-07-04 LAB
C TRACH DNA GENITAL QL NAA+PROBE: NEGATIVE
N. GONORRHOEAE DNA: NEGATIVE
ORGANISM: ABNORMAL
URINE CULTURE, ROUTINE: ABNORMAL

## 2021-07-04 NOTE — RESULT ENCOUNTER NOTE
Culture is positive. Patient was not placed on any antibiotics at discharge. They should be notified of the test results and placed on Bactrim DS to take 1-2 times per day for 7 days, #14. Please call this prescription to the pharmacy of their choice.

## 2021-07-04 NOTE — RESULT ENCOUNTER NOTE
Patient's positive result has been appropriately evaluated by the provider pool. Patient was contacted and notified of the change in treatment plan.     Medication was phoned to the patient's pharmacy per protocol:    Pharmacy:   Room 77 [address] [phone] [facility] [phone]  [place] [id number]  Phone: [phone] Fax: [phone]    Phone number: [phone]  Pharmacist receiving the prescription: [email or web address]

## 2021-11-22 ENCOUNTER — APPOINTMENT (OUTPATIENT)
Dept: GENERAL RADIOLOGY | Age: 26
End: 2021-11-22
Payer: MEDICARE

## 2021-11-22 ENCOUNTER — HOSPITAL ENCOUNTER (EMERGENCY)
Age: 26
Discharge: HOME | End: 2021-11-22
Payer: MEDICARE

## 2021-11-22 ENCOUNTER — APPOINTMENT (OUTPATIENT)
Dept: CT IMAGING | Age: 26
End: 2021-11-22
Payer: MEDICARE

## 2021-11-22 VITALS
TEMPERATURE: 98.1 F | SYSTOLIC BLOOD PRESSURE: 111 MMHG | HEIGHT: 63 IN | WEIGHT: 184.3 LBS | HEART RATE: 98 BPM | BODY MASS INDEX: 32.66 KG/M2 | OXYGEN SATURATION: 97 % | RESPIRATION RATE: 18 BRPM | DIASTOLIC BLOOD PRESSURE: 74 MMHG

## 2021-11-22 DIAGNOSIS — R51.9 NONINTRACTABLE HEADACHE, UNSPECIFIED CHRONICITY PATTERN, UNSPECIFIED HEADACHE TYPE: ICD-10-CM

## 2021-11-22 DIAGNOSIS — R52 BODY ACHES: ICD-10-CM

## 2021-11-22 DIAGNOSIS — R05.9 COUGH: ICD-10-CM

## 2021-11-22 DIAGNOSIS — U07.1 COVID-19: Primary | ICD-10-CM

## 2021-11-22 LAB
ANION GAP SERPL CALCULATED.3IONS-SCNC: 11 MMOL/L (ref 3–16)
BACTERIA: ABNORMAL /HPF
BASOPHILS ABSOLUTE: 0 K/UL (ref 0–0.2)
BASOPHILS RELATIVE PERCENT: 0.6 %
BILIRUBIN URINE: NEGATIVE
BLOOD, URINE: ABNORMAL
BUN BLDV-MCNC: 5 MG/DL (ref 7–20)
CALCIUM SERPL-MCNC: 9.8 MG/DL (ref 8.3–10.6)
CHLORIDE BLD-SCNC: 96 MMOL/L (ref 99–110)
CLARITY: CLEAR
CO2: 26 MMOL/L (ref 21–32)
COLOR: YELLOW
CREAT SERPL-MCNC: 0.6 MG/DL (ref 0.6–1.1)
EOSINOPHILS ABSOLUTE: 0 K/UL (ref 0–0.6)
EOSINOPHILS RELATIVE PERCENT: 1 %
EPITHELIAL CELLS, UA: 4 /HPF (ref 0–5)
GFR AFRICAN AMERICAN: >60
GFR NON-AFRICAN AMERICAN: >60
GLUCOSE BLD-MCNC: 97 MG/DL (ref 70–99)
GLUCOSE URINE: NEGATIVE MG/DL
HCT VFR BLD CALC: 41.8 % (ref 36–48)
HEMOGLOBIN: 14.1 G/DL (ref 12–16)
HYALINE CASTS: 1 /LPF (ref 0–8)
KETONES, URINE: NEGATIVE MG/DL
LACTIC ACID: 1 MMOL/L (ref 0.4–2)
LEUKOCYTE ESTERASE, URINE: NEGATIVE
LYMPHOCYTES ABSOLUTE: 0.7 K/UL (ref 1–5.1)
LYMPHOCYTES RELATIVE PERCENT: 15.9 %
MCH RBC QN AUTO: 30.5 PG (ref 26–34)
MCHC RBC AUTO-ENTMCNC: 33.8 G/DL (ref 31–36)
MCV RBC AUTO: 90 FL (ref 80–100)
MICROSCOPIC EXAMINATION: YES
MONOCYTES ABSOLUTE: 0.5 K/UL (ref 0–1.3)
MONOCYTES RELATIVE PERCENT: 10.1 %
NEUTROPHILS ABSOLUTE: 3.4 K/UL (ref 1.7–7.7)
NEUTROPHILS RELATIVE PERCENT: 72.4 %
NITRITE, URINE: NEGATIVE
PDW BLD-RTO: 12 % (ref 12.4–15.4)
PH UA: 6.5 (ref 5–8)
PLATELET # BLD: 234 K/UL (ref 135–450)
PMV BLD AUTO: 7.5 FL (ref 5–10.5)
POTASSIUM REFLEX MAGNESIUM: 3.6 MMOL/L (ref 3.5–5.1)
PROTEIN UA: NEGATIVE MG/DL
RAPID INFLUENZA  B AGN: NEGATIVE
RAPID INFLUENZA A AGN: NEGATIVE
RBC # BLD: 4.64 M/UL (ref 4–5.2)
RBC UA: 0 /HPF (ref 0–4)
SARS-COV-2, NAAT: DETECTED
SODIUM BLD-SCNC: 133 MMOL/L (ref 136–145)
SPECIFIC GRAVITY UA: 1.01 (ref 1–1.03)
TROPONIN: <0.01 NG/ML
URINE REFLEX TO CULTURE: ABNORMAL
URINE TYPE: ABNORMAL
UROBILINOGEN, URINE: 1 E.U./DL
WBC # BLD: 4.7 K/UL (ref 4–11)
WBC UA: 3 /HPF (ref 0–5)

## 2021-11-22 PROCEDURE — 84484 ASSAY OF TROPONIN QUANT: CPT

## 2021-11-22 PROCEDURE — 6370000000 HC RX 637 (ALT 250 FOR IP): Performed by: NURSE PRACTITIONER

## 2021-11-22 PROCEDURE — 36415 COLL VENOUS BLD VENIPUNCTURE: CPT

## 2021-11-22 PROCEDURE — 99283 EMERGENCY DEPT VISIT LOW MDM: CPT

## 2021-11-22 PROCEDURE — 6360000002 HC RX W HCPCS: Performed by: NURSE PRACTITIONER

## 2021-11-22 PROCEDURE — 80048 BASIC METABOLIC PNL TOTAL CA: CPT

## 2021-11-22 PROCEDURE — 71046 X-RAY EXAM CHEST 2 VIEWS: CPT

## 2021-11-22 PROCEDURE — 87635 SARS-COV-2 COVID-19 AMP PRB: CPT

## 2021-11-22 PROCEDURE — 2580000003 HC RX 258: Performed by: NURSE PRACTITIONER

## 2021-11-22 PROCEDURE — 83605 ASSAY OF LACTIC ACID: CPT

## 2021-11-22 PROCEDURE — 87040 BLOOD CULTURE FOR BACTERIA: CPT

## 2021-11-22 PROCEDURE — 87804 INFLUENZA ASSAY W/OPTIC: CPT

## 2021-11-22 PROCEDURE — 85025 COMPLETE CBC W/AUTO DIFF WBC: CPT

## 2021-11-22 PROCEDURE — 81001 URINALYSIS AUTO W/SCOPE: CPT

## 2021-11-22 RX ORDER — 0.9 % SODIUM CHLORIDE 0.9 %
1000 INTRAVENOUS SOLUTION INTRAVENOUS ONCE
Status: COMPLETED | OUTPATIENT
Start: 2021-11-22 | End: 2021-11-22

## 2021-11-22 RX ORDER — IBUPROFEN 800 MG/1
800 TABLET ORAL EVERY 8 HOURS PRN
Qty: 20 TABLET | Refills: 0 | Status: SHIPPED | OUTPATIENT
Start: 2021-11-22

## 2021-11-22 RX ORDER — CODEINE PHOSPHATE AND GUAIFENESIN 10; 100 MG/5ML; MG/5ML
10 SOLUTION ORAL ONCE
Status: COMPLETED | OUTPATIENT
Start: 2021-11-22 | End: 2021-11-22

## 2021-11-22 RX ORDER — ASPIRIN 81 MG/1
81 TABLET ORAL DAILY
Qty: 14 TABLET | Refills: 0 | Status: SHIPPED | OUTPATIENT
Start: 2021-11-22 | End: 2021-12-06

## 2021-11-22 RX ORDER — ALBUTEROL SULFATE 90 UG/1
2 AEROSOL, METERED RESPIRATORY (INHALATION) 4 TIMES DAILY PRN
Qty: 18 G | Refills: 0 | Status: SHIPPED | OUTPATIENT
Start: 2021-11-22

## 2021-11-22 RX ORDER — GUAIFENESIN AND CODEINE PHOSPHATE 100; 10 MG/5ML; MG/5ML
5 SOLUTION ORAL 3 TIMES DAILY PRN
Qty: 40 ML | Refills: 0 | Status: SHIPPED | OUTPATIENT
Start: 2021-11-22 | End: 2021-11-25

## 2021-11-22 RX ORDER — DEXAMETHASONE SODIUM PHOSPHATE 10 MG/ML
10 INJECTION INTRAMUSCULAR; INTRAVENOUS ONCE
Status: COMPLETED | OUTPATIENT
Start: 2021-11-22 | End: 2021-11-22

## 2021-11-22 RX ORDER — ACETAMINOPHEN 500 MG
1000 TABLET ORAL ONCE
Status: COMPLETED | OUTPATIENT
Start: 2021-11-22 | End: 2021-11-22

## 2021-11-22 RX ORDER — ACETAMINOPHEN 500 MG
1000 TABLET ORAL 4 TIMES DAILY PRN
Qty: 60 TABLET | Refills: 0 | Status: SHIPPED | OUTPATIENT
Start: 2021-11-22

## 2021-11-22 RX ADMIN — DEXAMETHASONE SODIUM PHOSPHATE 10 MG: 10 INJECTION INTRAMUSCULAR; INTRAVENOUS at 21:50

## 2021-11-22 RX ADMIN — SODIUM CHLORIDE 1000 ML: 9 INJECTION, SOLUTION INTRAVENOUS at 21:43

## 2021-11-22 RX ADMIN — GUAIFENESIN AND CODEINE PHOSPHATE 10 ML: 100; 10 SOLUTION ORAL at 20:32

## 2021-11-22 RX ADMIN — SODIUM CHLORIDE 1000 ML: 9 INJECTION, SOLUTION INTRAVENOUS at 20:24

## 2021-11-22 RX ADMIN — ACETAMINOPHEN 1000 MG: 500 TABLET ORAL at 20:32

## 2021-11-22 ASSESSMENT — PAIN DESCRIPTION - PAIN TYPE
TYPE: ACUTE PAIN
TYPE: ACUTE PAIN

## 2021-11-22 ASSESSMENT — PAIN SCALES - GENERAL
PAINLEVEL_OUTOF10: 7
PAINLEVEL_OUTOF10: 8
PAINLEVEL_OUTOF10: 4

## 2021-11-22 ASSESSMENT — PAIN DESCRIPTION - DESCRIPTORS
DESCRIPTORS: ACHING
DESCRIPTORS: ACHING

## 2021-11-22 ASSESSMENT — PAIN DESCRIPTION - LOCATION
LOCATION: GENERALIZED
LOCATION: HEAD

## 2021-11-22 ASSESSMENT — PAIN DESCRIPTION - FREQUENCY
FREQUENCY: CONTINUOUS
FREQUENCY: CONTINUOUS

## 2021-11-22 NOTE — Clinical Note
Mireya Smith was seen and treated in our emergency department on 11/22/2021. She may return to work on 12/02/2021. If you have any questions or concerns, please don't hesitate to call.       DAO Nobles - CNP

## 2021-11-23 ENCOUNTER — CARE COORDINATION (OUTPATIENT)
Dept: CARE COORDINATION | Age: 26
End: 2021-11-23

## 2021-11-23 NOTE — CARE COORDINATION
Patient contacted regarding COVID-19 diagnosis. Discussed COVID-19 related testing which was available at this time. Test results were positive. Patient informed of results, if available? Yes. Ambulatory Care Manager contacted the patient by telephone to perform post discharge assessment. Call within 2 business days of discharge: Yes. Verified name and  with patient as identifiers. Provided introduction to self, and explanation of the CTN/ACM role, and reason for call due to risk factors for infection and/or exposure to COVID-19. Symptoms reviewed with patient who verbalized the following symptoms: no new symptoms and no worsening symptoms. Due to no new or worsening symptoms encounter was not routed to provider for escalation. Discussed follow-up appointments. If no appointment was previously scheduled, appointment scheduling offered: Yes. Declined need for assistance in outreach to PCP for scheduling/planning ED follow up  [facility] follow up appointment(s): No future appointments. Non-[facility] follow up appointment(s):     Non-face-to-face services provided:  Obtained and reviewed discharge summary and/or continuity of care documents     Advance Care Planning:   Does patient have an Advance Directive:  not on file; education provided. Educated patient about risk for severe COVID-19 due to risk factors according to CDC guidelines. ACM reviewed discharge instructions, medical action plan and red flag symptoms with the patient who verbalized understanding. Discussed COVID vaccination status: Yes. Education provided on COVID-19 vaccination as appropriate. Reports she received the 1st injection in 2 part series for Covid vaccine less than a week ago and now plans to follow w/PCP re when to plan for 2nd in series - since she now has active Covid virus. Discussed exposure protocols and quarantine with CDC Guidelines.  Patient was given an opportunity to verbalize any questions and concerns and agrees to contact ACM or health care provider for questions related to their healthcare. Reviewed and educated patient on any new and changed medications related to discharge diagnosis     Was patient discharged with a pulse oximeter? No Discussed and confirmed pt understanding of ED discharge instructions and when to notify provider or seek emergency care. 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 Pt verbalized understanding of above and agreement with the following  Plan:  ACM provided contact information. No further follow-up call identified based on severity of symptoms and risk factors.

## 2021-11-23 NOTE — ED PROVIDER NOTES
in the ED)      ALLERGIES    No Known Allergies    SOCIAL & FAMILY HISTORY    Social History     Socioeconomic History    Marital status: Single     Spouse name: Not on file    Number of children: Not on file    Years of education: Not on file    Highest education level: Not on file   Occupational History    Not on file   Tobacco Use    Smoking status: Never Smoker    Smokeless tobacco: Never Used   Vaping Use    Vaping Use: Never used   Substance and Sexual Activity    Alcohol use: No    Drug use: No    Sexual activity: Yes     Partners: Male   Other Topics Concern    Not on file   Social History Narrative    Not on file     Social Determinants of Health     Financial Resource Strain:     Difficulty of Paying Living Expenses: Not on file   Food Insecurity:     Worried About Running Out of Food in the Last Year: Not on file    Pako of Food in the Last Year: Not on file   Transportation Needs:     Lack of Transportation (Medical): Not on file    Lack of Transportation (Non-Medical):  Not on file   Physical Activity:     Days of Exercise per Week: Not on file    Minutes of Exercise per Session: Not on file   Stress:     Feeling of Stress : Not on file   Social Connections:     Frequency of Communication with Friends and Family: Not on file    Frequency of Social Gatherings with Friends and Family: Not on file    Attends [employer]ism Services: Not on file    Active Member of [address] [place] [address] [employer] or Organizations: Not on file    Attends Club or Organization Meetings: Not on file    Marital Status: Not on file   Intimate Partner Violence:     Fear of Current or Ex-Partner: Not on file    Emotionally Abused: Not on file    Physically Abused: Not on file    Sexually Abused: Not on file   Housing Stability:     Unable to Pay for Housing in the Last Year: Not on file    Number of [place] in the Last Year: Not on file    Unstable Housing in the Last Year: Not on file     No family history on Absolute 0.0 0.0 - 0.6 K/uL    Basophils Absolute 0.0 0.0 - 0.2 K/uL   Basic Metabolic Panel w/ Reflex to MG   Result Value Ref Range    Sodium 133 (L) 136 - 145 mmol/L    Potassium reflex Magnesium 3.6 3.5 - 5.1 mmol/L    Chloride 96 (L) 99 - 110 mmol/L    CO2 26 21 - 32 mmol/L    Anion Gap 11 3 - 16    Glucose 97 70 - 99 mg/dL    BUN 5 (L) 7 - 20 mg/dL    CREATININE 0.6 0.6 - 1.1 mg/dL    GFR Non-African American >60 >60    GFR African American >60 >60    Calcium 9.8 8.3 - 10.6 mg/dL   Lactic Acid, Plasma   Result Value Ref Range    Lactic Acid 1.0 0.4 - 2.0 mmol/L   Urinalysis Reflex to Culture    Specimen: Urine, clean catch   Result Value Ref Range    Color, UA YELLOW Straw/Yellow    Clarity, UA Clear Clear    Glucose, Ur Negative Negative mg/dL    Bilirubin Urine Negative Negative    Ketones, Urine Negative Negative mg/dL    Specific Gravity, UA 1.013 1.005 - 1.030    Blood, Urine TRACE (A) Negative    pH, UA 6.5 5.0 - 8.0    Protein, UA Negative Negative mg/dL    Urobilinogen, Urine 1.0 <2.0 E.U./dL    Nitrite, Urine Negative Negative    Leukocyte Esterase, Urine Negative Negative    Microscopic Examination YES     Urine Type NotGiven     Urine Reflex to Culture Not Indicated    Troponin   Result Value Ref Range    Troponin <0.01 <0.01 ng/mL   Microscopic Urinalysis   Result Value Ref Range    Bacteria, UA 1+ (A) None Seen /HPF    Hyaline Casts, UA 1 0 - 8 /LPF    WBC, UA 3 0 - 5 /HPF    RBC, UA 0 0 - 4 /HPF    Epithelial Cells, UA 4 0 - 5 /HPF        RADIOLOGY/PROCEDURES    XR CHEST (2 VW)   Final Result   No acute abnormality. ED COURSE & MEDICAL DECISION MAKING    Pertinent Labs & Imaging studies reviewed and interpreted. (See chart for details)    See chart for details of medications given during the ED stay.     Vitals:    11/22/21 1932   BP: 104/70   Pulse: 122   Resp: 18   Temp: 99.9 °F (37.7 °C)   TempSrc: Oral   SpO2: 95%   Weight: 184 lb 4.9 oz (83.6 kg)   Height: 5' 3\" (1.6 m) Differential Diagnosis: Acute Coronary Syndrome, Congestive Heart Failure, Myocardial Infarction, Pulmonary Embolus, Pneumonia, Pneumothorax, COVID-19, Influenza, other    CRITICAL CARE NOTE:  There was a high probability of clinically significant life-threatening deterioration of the patient's condition requiring my urgent intervention. Total critical care time was at least 15 minutes. This includes vital sign monitoring, pulse oximetry monitoring, telemetry monitoring, clinical response to the IV medications, reviewing the nursing notes, consultation time, dictation/documentation time, and interpretation of the labwork. This excludes any separately billable procedures performed. Patient is afebrile and nontoxic in appearance. Labs reveal no leukocytosis or anemia. Metabolic panel unremarkable. Urinalysis unremarkable. CXR findings as above. No lactic acidosis. Rapid influenza negative. Rapid Covid is positive. CT was discussed with the patient, her heart rate has come down quite a significant amount, however I cannot apply the PERC criteria she is still slightly tachycardic when she is on birth control. No personal history of DVT or PE, nor do they run in the family. She is not having any chest pain, no shortness of breath and has been more than 95% on room air even with exertion. We spoke about this through shared decision making, I explained to her that although there is a low likelihood of having a PE I cannot rule this out without the CT PE study but she declined this. Her tachycardia is likely secondary to the viral illness. Again she has no symptoms to suggest a PE currently other than the tachycardia but this did resolve significantly with IV fluids. And was not tachycardic at discharge. Wells and PERC criteria negative with resolution of tachycardia. EKG interpreted by physician. Troponin negative.         Covid Decompensation Risk Scale    Screen for Imminent Risk:  -O2 Sat <95%, SBP <100 or DBP <60?  -RR >22?  -Age 61+ AND Pulse >100 at time of disposition? ---------> High Risk- Recommend Admission      Clinical Risk Score:                                                                                                                       -history of CHF, COPD, age >57 = +2 pts each                                                                 -CXR:    Moderate, non-lobar = +2   1 lobar infiltrate = +4   Severe bilateral OR 2+ lobar infiltrates = +8    - HR >100 at disposition: +2 pts    -SpO2 < 92% on RA = +4 pts    -RR> 20 = 1 pt each    Total 2    Score ? 4: Low Risk   0-2 = home with routine follow up   3-4 = home with SpO2 monitor or 24h f/u  *consider ASA 81mg PO daily for 2 weeks if no contraindication or allergy      Base off of the above scale patient scores a 2 for risk of decompensation within 24 hours due to [place]. This was utilized via the clinical management tool as above from [facility]. This puts patient into the LOW risk category for decompensation within 24 hours and based off of this the recommendation is for the patient to be discharged home with baby aspirin for 2 weeks as well as medications for symptoms. She is to return immediately for any new or worsening symptoms. She verbalized understanding, has no further questions or concerns and will be discharged home in stable condition. .    I estimate there is LOW risk for ACUTE CORONARY SYNDROME, CHRONIC OBSTRUCTIVE PULMONARY DISEASE, CONGESTIVE HEART FAILURE, PERICARDIAL TAMPONADE, PNEUMONIA, PNEUMOTHORAX, PULMONARY EMBOLISM, SEPSIS, and THORACIC DISSECTION,  thus I consider the discharge disposition reasonable. Windy Bang and I have discussed the diagnosis and risks, and we agree with discharging home to follow-up with their primary doctor. We also discussed returning to the Emergency Department immediately if new or worsening symptoms occur.  We have discussed the symptoms which are most concerning (e.g., bloody sputum, fever, worsening pain or shortness of breath, vomiting) that necessitate immediate return. FINAL IMPRESSION    1. COVID-19    2. Cough    3. Body aches    4.  Nonintractable headache, unspecified chronicity pattern, unspecified headache type          /70   Pulse 122   Temp 99.9 °F (37.7 °C) (Oral)   Resp 18   Ht 5' 3\" (1.6 m)   Wt 184 lb 4.9 oz (83.6 kg)   SpO2 95%   BMI 32.65 kg/m²        PLAN  Discharge    (Please note that this note was completed with a voice recognition program.  Every attempt was made to edit the dictations, but inevitably there remain words that are mis-transcribed.)       DAO Segundo - RESHMA  11/22/21 5266

## 2021-11-26 LAB — CULTURE, BLOOD 2: NORMAL

## 2021-11-30 ENCOUNTER — APPOINTMENT (OUTPATIENT)
Dept: GENERAL RADIOLOGY | Age: 26
End: 2021-11-30
Payer: MEDICARE

## 2021-11-30 ENCOUNTER — HOSPITAL ENCOUNTER (EMERGENCY)
Age: 26
Discharge: HOME | End: 2021-11-30
Payer: MEDICARE

## 2021-11-30 ENCOUNTER — APPOINTMENT (OUTPATIENT)
Dept: CT IMAGING | Age: 26
End: 2021-11-30
Payer: MEDICARE

## 2021-11-30 VITALS
HEART RATE: 110 BPM | WEIGHT: 185.85 LBS | BODY MASS INDEX: 32.92 KG/M2 | DIASTOLIC BLOOD PRESSURE: 62 MMHG | TEMPERATURE: 98.1 F | SYSTOLIC BLOOD PRESSURE: 108 MMHG | RESPIRATION RATE: 17 BRPM | OXYGEN SATURATION: 93 %

## 2021-11-30 DIAGNOSIS — J12.82 PNEUMONIA DUE TO COVID-19 VIRUS: Primary | ICD-10-CM

## 2021-11-30 DIAGNOSIS — U07.1 PNEUMONIA DUE TO COVID-19 VIRUS: Primary | ICD-10-CM

## 2021-11-30 LAB
ANION GAP SERPL CALCULATED.3IONS-SCNC: 13 MMOL/L (ref 3–16)
BASOPHILS ABSOLUTE: 0 K/UL (ref 0–0.2)
BASOPHILS RELATIVE PERCENT: 0.4 %
BUN BLDV-MCNC: 6 MG/DL (ref 7–20)
CALCIUM SERPL-MCNC: 8.9 MG/DL (ref 8.3–10.6)
CHLORIDE BLD-SCNC: 94 MMOL/L (ref 99–110)
CO2: 24 MMOL/L (ref 21–32)
CREAT SERPL-MCNC: 0.6 MG/DL (ref 0.6–1.1)
D DIMER: 480 NG/ML DDU (ref 0–229)
EOSINOPHILS ABSOLUTE: 0 K/UL (ref 0–0.6)
EOSINOPHILS RELATIVE PERCENT: 0 %
GFR AFRICAN AMERICAN: >60
GFR NON-AFRICAN AMERICAN: >60
GLUCOSE BLD-MCNC: 92 MG/DL (ref 70–99)
HCG QUALITATIVE: NEGATIVE
HCT VFR BLD CALC: 40.3 % (ref 36–48)
HEMOGLOBIN: 13.5 G/DL (ref 12–16)
LACTIC ACID: 0.8 MMOL/L (ref 0.4–2)
LYMPHOCYTES ABSOLUTE: 1.3 K/UL (ref 1–5.1)
LYMPHOCYTES RELATIVE PERCENT: 35.7 %
MAGNESIUM: 2 MG/DL (ref 1.8–2.4)
MCH RBC QN AUTO: 30.6 PG (ref 26–34)
MCHC RBC AUTO-ENTMCNC: 33.6 G/DL (ref 31–36)
MCV RBC AUTO: 91.1 FL (ref 80–100)
MONOCYTES ABSOLUTE: 0.3 K/UL (ref 0–1.3)
MONOCYTES RELATIVE PERCENT: 8.5 %
NEUTROPHILS ABSOLUTE: 2.1 K/UL (ref 1.7–7.7)
NEUTROPHILS RELATIVE PERCENT: 55.4 %
PDW BLD-RTO: 12.2 % (ref 12.4–15.4)
PLATELET # BLD: 186 K/UL (ref 135–450)
PMV BLD AUTO: 8 FL (ref 5–10.5)
POTASSIUM REFLEX MAGNESIUM: 3.4 MMOL/L (ref 3.5–5.1)
PRO-BNP: <5 PG/ML (ref 0–124)
PROCALCITONIN: 0.03 NG/ML (ref 0–0.15)
RBC # BLD: 4.43 M/UL (ref 4–5.2)
SODIUM BLD-SCNC: 131 MMOL/L (ref 136–145)
TROPONIN: <0.01 NG/ML
WBC # BLD: 3.7 K/UL (ref 4–11)

## 2021-11-30 PROCEDURE — 83605 ASSAY OF LACTIC ACID: CPT

## 2021-11-30 PROCEDURE — 36415 COLL VENOUS BLD VENIPUNCTURE: CPT

## 2021-11-30 PROCEDURE — 85379 FIBRIN DEGRADATION QUANT: CPT

## 2021-11-30 PROCEDURE — 99283 EMERGENCY DEPT VISIT LOW MDM: CPT

## 2021-11-30 PROCEDURE — 87040 BLOOD CULTURE FOR BACTERIA: CPT

## 2021-11-30 PROCEDURE — 84145 PROCALCITONIN (PCT): CPT

## 2021-11-30 PROCEDURE — 84703 CHORIONIC GONADOTROPIN ASSAY: CPT

## 2021-11-30 PROCEDURE — 83735 ASSAY OF MAGNESIUM: CPT

## 2021-11-30 PROCEDURE — 71046 X-RAY EXAM CHEST 2 VIEWS: CPT

## 2021-11-30 PROCEDURE — 83880 ASSAY OF NATRIURETIC PEPTIDE: CPT

## 2021-11-30 PROCEDURE — 6360000002 HC RX W HCPCS: Performed by: GENERAL ACUTE CARE HOSPITAL

## 2021-11-30 PROCEDURE — 80048 BASIC METABOLIC PNL TOTAL CA: CPT

## 2021-11-30 PROCEDURE — 84484 ASSAY OF TROPONIN QUANT: CPT

## 2021-11-30 PROCEDURE — 93005 ELECTROCARDIOGRAM TRACING: CPT | Performed by: GENERAL ACUTE CARE HOSPITAL

## 2021-11-30 PROCEDURE — 85025 COMPLETE CBC W/AUTO DIFF WBC: CPT

## 2021-11-30 PROCEDURE — 6360000004 HC RX CONTRAST MEDICATION: Performed by: GENERAL ACUTE CARE HOSPITAL

## 2021-11-30 PROCEDURE — 71260 CT THORAX DX C+: CPT

## 2021-11-30 PROCEDURE — 96375 TX/PRO/DX INJ NEW DRUG ADDON: CPT

## 2021-11-30 PROCEDURE — 96374 THER/PROPH/DIAG INJ IV PUSH: CPT

## 2021-11-30 PROCEDURE — 6370000000 HC RX 637 (ALT 250 FOR IP): Performed by: EMERGENCY MEDICINE

## 2021-11-30 RX ORDER — MORPHINE SULFATE 4 MG/ML
4 INJECTION, SOLUTION INTRAMUSCULAR; INTRAVENOUS ONCE
Status: COMPLETED | OUTPATIENT
Start: 2021-11-30 | End: 2021-11-30

## 2021-11-30 RX ORDER — 0.9 % SODIUM CHLORIDE 0.9 %
2000 INTRAVENOUS SOLUTION INTRAVENOUS ONCE
Status: DISCONTINUED | OUTPATIENT
Start: 2021-11-30 | End: 2021-11-30 | Stop reason: HOSPADM

## 2021-11-30 RX ORDER — ACETAMINOPHEN 325 MG/1
650 TABLET ORAL ONCE
Status: COMPLETED | OUTPATIENT
Start: 2021-11-30 | End: 2021-11-30

## 2021-11-30 RX ORDER — PROMETHAZINE HYDROCHLORIDE 25 MG/ML
12.5 INJECTION, SOLUTION INTRAMUSCULAR; INTRAVENOUS ONCE
Status: DISCONTINUED | OUTPATIENT
Start: 2021-11-30 | End: 2021-11-30 | Stop reason: ALTCHOICE

## 2021-11-30 RX ORDER — KETOROLAC TROMETHAMINE 30 MG/ML
30 INJECTION, SOLUTION INTRAMUSCULAR; INTRAVENOUS ONCE
Status: COMPLETED | OUTPATIENT
Start: 2021-11-30 | End: 2021-11-30

## 2021-11-30 RX ADMIN — IOPAMIDOL 75 ML: 755 INJECTION, SOLUTION INTRAVENOUS at 18:32

## 2021-11-30 RX ADMIN — ACETAMINOPHEN 650 MG: 325 TABLET ORAL at 15:28

## 2021-11-30 RX ADMIN — Medication 2000 ML: at 16:19

## 2021-11-30 RX ADMIN — KETOROLAC TROMETHAMINE 30 MG: 30 INJECTION, SOLUTION INTRAMUSCULAR; INTRAVENOUS at 16:19

## 2021-11-30 RX ADMIN — MORPHINE SULFATE 4 MG: 4 INJECTION, SOLUTION INTRAMUSCULAR; INTRAVENOUS at 17:49

## 2021-11-30 ASSESSMENT — ENCOUNTER SYMPTOMS
BACK PAIN: 0
SORE THROAT: 1
SHORTNESS OF BREATH: 1
VOMITING: 0
VOICE CHANGE: 0
SINUS PRESSURE: 1
NAUSEA: 1
CHEST TIGHTNESS: 0
DIARRHEA: 0
ABDOMINAL PAIN: 0
WHEEZING: 0
COUGH: 1

## 2021-11-30 ASSESSMENT — PAIN SCALES - GENERAL
PAINLEVEL_OUTOF10: 8

## 2021-11-30 ASSESSMENT — PAIN DESCRIPTION - PROGRESSION: CLINICAL_PROGRESSION: NOT CHANGED

## 2021-11-30 ASSESSMENT — PAIN DESCRIPTION - ONSET: ONSET: GRADUAL

## 2021-11-30 ASSESSMENT — PAIN DESCRIPTION - LOCATION
LOCATION: GENERALIZED
LOCATION: GENERALIZED

## 2021-11-30 ASSESSMENT — PAIN DESCRIPTION - FREQUENCY: FREQUENCY: CONTINUOUS

## 2021-11-30 ASSESSMENT — PAIN DESCRIPTION - DESCRIPTORS
DESCRIPTORS: SORE
DESCRIPTORS: ACHING

## 2021-11-30 ASSESSMENT — PAIN DESCRIPTION - PAIN TYPE
TYPE: ACUTE PAIN
TYPE: ACUTE PAIN

## 2021-11-30 NOTE — ED PROVIDER NOTES
I was available for consultation during patient's ED stay. Patient was cared for by CORINA. I did not evaluate or participate in patient care. The Ekg interpreted by me shows  Sinus tachycardia with a rate of 130, normal axis, , QRS 82, QTc 459, no ST elevations, nonspecific ST changes, no prior EKG on file.     Charley Dowd MD  [facility] [place] [place]  11/30/21  5:26 PM MARINA Dowd MD  11/30/21 9106

## 2021-11-30 NOTE — ED PROVIDER NOTES
[address] [facility]        Pt Name: Krupa Garcia  MRN: [id number]  [place] 1995  Date of evaluation: 11/30/2021  Provider: DAO Rodriguez - RESHMA  PCP: Yasmine Leblanc MD  Note Started: 4:09 PM EST       CORINA. I have evaluated this patient. My supervising physician was available for consultation. CHIEF COMPLAINT       Chief Complaint   Patient presents with    Positive For Covid-19     fever, weakness, emesis. Positive last Monday        HISTORY OF PRESENT ILLNESS   (Location, Timing/Onset, Context/Setting, Quality, Duration, Modifying Factors, Severity, Associated Signs and Symptoms)  Note limiting factors. Chief Complaint: Cough, weakness, fever, vomiting    Krupa Garcia is a 23 y.o. female who presents to the emergency department today reporting generalized body aches, weakness, fever, and vomiting. Patient states that she received her Covid vaccine on November 19. Patient states that 3 days later she developed symptoms and tested positive for Covid. Patient states that she feels as if symptoms are progressing. She denies recent travel or known sick contacts. There is no history of COPD or asthma. There is no history of any chronic medical conditions or comorbidities. Patient states that her cough is productive of yellow phlegm. Patient states that she vomited today and noticed streaks of blood in her emesis. She denies having any diarrhea. Patient states that she has continued to have fever. She denies having any chest pain but does report shortness of breath with exertion. Patient has been taking OTC Tylenol and ibuprofen with little relief of her symptoms. Nursing Notes were all reviewed and agreed with or any disagreements were addressed in the HPI.     REVIEW OF SYSTEMS    (2-9 systems for level 4, 10 or more for level 5)     Review of Systems   Constitutional: Positive for appetite change, chills, fatigue and fever.   HENT: Positive for congestion, drooling, sinus pressure and sore throat. Negative for voice change. Eyes: Negative for visual disturbance. Respiratory: Positive for cough and shortness of breath. Negative for chest tightness and wheezing. Cardiovascular: Negative for chest pain, palpitations and leg swelling. Gastrointestinal: Positive for nausea. Negative for abdominal pain, diarrhea and vomiting. Endocrine: Negative for polydipsia and polyuria. Genitourinary: Negative for difficulty urinating and dysuria. Musculoskeletal: Positive for arthralgias and myalgias. Negative for back pain, gait problem, joint swelling and neck stiffness. Skin: Negative for rash and wound. Allergic/Immunologic: Negative for immunocompromised state. Neurological: Positive for light-headedness. Negative for dizziness, seizures, weakness and headaches. Hematological: Does not bruise/bleed easily. Psychiatric/Behavioral: Negative for suicidal ideas. Positives and Pertinent negatives as per HPI. Except as noted above in the ROS, all other systems were reviewed and negative.        PAST MEDICAL HISTORY     Past Medical History:   Diagnosis Date    Depression          SURGICAL HISTORY     Past Surgical History:   Procedure Laterality Date    CHOLECYSTECTOMY           CURRENTMEDICATIONS       Discharge Medication List as of 11/30/2021  8:31 PM      CONTINUE these medications which have NOT CHANGED    Details   ibuprofen (IBU) 800 MG tablet Take 1 tablet by mouth every 8 hours as needed for Pain, Disp-20 tablet, R-0Print      acetaminophen (TYLENOL) 500 MG tablet Take 2 tablets by mouth 4 times daily as needed for Pain or Fever, Disp-60 tablet, R-0Print      albuterol sulfate HFA (VENTOLIN HFA) 108 (90 Base) MCG/ACT inhaler Inhale 2 puffs into the lungs 4 times daily as needed for Wheezing, Disp-18 g, R-0Print      aspirin EC 81 MG EC tablet Take 1 tablet by mouth daily for 14 days, Disp-14 tablet, R-0Print               ALLERGIES     Patient has no known allergies. FAMILYHISTORY     History reviewed. No pertinent family history. SOCIAL HISTORY       Social History     Tobacco Use    Smoking status: Never Smoker    Smokeless tobacco: Never Used   Vaping Use    Vaping Use: Never used   Substance Use Topics    Alcohol use: No    Drug use: No       SCREENINGS             PHYSICAL EXAM    (up to 7 for level 4, 8 or more for level 5)     ED Triage Vitals   BP Temp Temp Source Pulse Resp SpO2 Height Weight   11/30/21 1520 11/30/21 1520 11/30/21 1520 11/30/21 1520 11/30/21 1520 11/30/21 1522 -- 11/30/21 1524   (!) 122/90 103.1 °F (39.5 °C) Oral 136 16 96 %  185 lb 13.6 oz (84.3 kg)       Physical Exam  Vitals and nursing note reviewed. Constitutional:       Appearance: Normal appearance. She is ill-appearing. She is not toxic-appearing or diaphoretic. HENT:      Head: Normocephalic and atraumatic. Right Ear: Tympanic membrane, ear canal and external ear normal.      Left Ear: Tympanic membrane, ear canal and external ear normal.      Nose: Nose normal.      Mouth/Throat:      Mouth: Mucous membranes are dry. Eyes:      General:         Right eye: No discharge. Left eye: No discharge. Extraocular Movements: Extraocular movements intact. Conjunctiva/sclera: Conjunctivae normal.      Pupils: Pupils are equal, round, and reactive to light. Cardiovascular:      Rate and Rhythm: Regular rhythm. Tachycardia present. Pulses: Normal pulses. Heart sounds: Normal heart sounds. Pulmonary:      Effort: Pulmonary effort is normal. No respiratory distress. Breath sounds: Normal breath sounds. Abdominal:      General: Bowel sounds are normal.      Palpations: Abdomen is soft. Tenderness: There is no abdominal tenderness. There is no right CVA tenderness, left CVA tenderness, guarding or rebound. Musculoskeletal:         General: Normal range of motion. Cervical back: Normal range of motion and neck supple. Right lower leg: No edema. Left lower leg: No edema. Skin:     General: Skin is warm and dry. Capillary Refill: Capillary refill takes less than 2 seconds. Neurological:      General: No focal deficit present. Mental Status: She is alert and oriented to person, place, and time. Psychiatric:         Mood and Affect: Mood normal.         Behavior: Behavior normal.         Thought Content:  Thought content normal.         Judgment: Judgment normal.         DIAGNOSTIC RESULTS   LABS:    Labs Reviewed   CBC WITH AUTO DIFFERENTIAL - Abnormal; Notable for the following components:       Result Value    WBC 3.7 (*)     RDW 12.2 (*)     All other components within normal limits    Narrative:     Performed at:  [address]   Phone [phone]   BASIC METABOLIC PANEL W/ REFLEX TO MG FOR LOW K - Abnormal; Notable for the following components:    Sodium 131 (*)     Potassium reflex Magnesium 3.4 (*)     Chloride 94 (*)     BUN 6 (*)     All other components within normal limits    Narrative:     Performed at:  [address] [address]   Phone [phone]   D-DIMER, QUANTITATIVE - Abnormal; Notable for the following components:    D-Dimer, Quant 480 (*)     All other components within normal limits    Narrative:     Performed at:  [address] [address]   Phone [phone]   CULTURE, BLOOD 1   HCG, SERUM, QUALITATIVE    Narrative:     Performed at:  [address] [address]   Phone [phone]   BRAIN NATRIURETIC PEPTIDE    Narrative:     Performed at:  [address]   Phone [phone]   TROPONIN Narrative:     Performed at:  [facility]  [address] [place] [address]   Phone [phone]   LACTIC ACID, PLASMA    Narrative:     Performed at:  [facility]  [address]  [place] [address]   Phone [phone]   PROCALCITONIN    Narrative:     Performed at:  [facility]  [address] [facility] [place] [address]   Phone [phone]   MAGNESIUM    Narrative:     Performed at:  [facility]  [address] [facility] [place] [address] [facility] [address]   Phone [phone]       When ordered only abnormal lab results are displayed. All other labs were within normal range or not returned as of this dictation. EKG: When ordered, EKG's are interpreted by the Emergency Department Physician in the absence of a cardiologist.  Please see their note for interpretation of EKG. RADIOLOGY:   Non-plain film images such as CT, Ultrasound and MRI are read by the radiologist. Plain radiographic images are visualized and preliminarily interpreted by the ED Provider with the below findings:        Interpretation per the Radiologist below, if available at the time of this note:    CT CHEST PULMONARY EMBOLISM W CONTRAST   Final Result   No evidence of pulmonary embolism. Multifocal pneumonia bilaterally, consistent with COVID-19 in the appropriate   clinical setting. XR CHEST (2 VW)   Final Result   New patchy airspace opacities in the mid to basilar lungs likely due to   COVID-19 pneumonia given patient history.            XR CHEST (2 VW)    Result Date: 11/30/2021  EXAMINATION: TWO XRAY VIEWS OF THE CHEST 11/30/2021 3:31 pm COMPARISON: Chest radiograph 11/20/2021 HISTORY: ORDERING SYSTEM PROVIDED HISTORY: Shortness of breath TECHNOLOGIST PROVIDED HISTORY: Reason for exam:->Shortness of breath Reason for Exam: sob FINDINGS: New patchy airspace opacities in the mid to basilar lungs. No findings of pneumothorax or pleural effusion. Normal mediastinal, hilar, and cardiac contours. No acute fracture. New patchy airspace opacities in the mid to basilar lungs likely due to COVID-19 pneumonia given patient history. PROCEDURES   Unless otherwise noted below, none     Procedures    CRITICAL CARE TIME   N/A    CONSULTS:  None      EMERGENCY DEPARTMENT COURSE and DIFFERENTIAL DIAGNOSIS/MDM:   Vitals:    Vitals:    11/30/21 1745 11/30/21 1815 11/30/21 1915 11/30/21 1945   BP: (!) 111/59  (!) 108/57 108/62   Pulse: 116 110 109 110   Resp: 28 20 22 17   Temp:    98.1 °F (36.7 °C)   TempSrc:    Oral   SpO2: 95% 94% 94% 93%   Weight:           Patient was given the following medications:  Medications   acetaminophen (TYLENOL) tablet 650 mg (650 mg Oral Given 11/30/21 1528)   ketorolac (TORADOL) injection 30 mg (30 mg IntraVENous Given 11/30/21 1619)   morphine (PF) injection 4 mg (4 mg IntraVENous Given 11/30/21 1749)   iopamidol (ISOVUE-370) 76 % injection 75 mL (75 mLs IntraVENous Given 11/30/21 1832)         Previous records reviewed in order to gain further information regarding patient's PMH as well as her HPI. Nursing notes reviewed. This is a 78-year-old otherwise healthy -American female who presents to the emergency department today reporting generalized body aches, fever, and cough. Patient states that she was diagnosed with Covid approximately 8 days ago. Patient did receive his first Covid vaccine. Physical exam complete. Patient has nontoxic, febrile, and tachycardic. Sepsis work-up initiated. Patient medicated with Tylenol. D-dimer mildly elevated. Lactic acid within normal limits. She is medicated for discomfort in addition to given IV normal saline 2 L. CT chest shows findings consistent with Covid pneumonia but no PE. Patient reassessed. She does report significant relief of symptoms after intervention.   Patient's vital signs improved after intervention. Patient ambulates without difficulty and oxygen saturation remains within normal limits. At this time there is no evidence of any life-threatening or emergent conditions requiring immediate intervention. No evidence of sepsis. Low suspicion for bacterial pneumonia. Patient will be discharged with emphasis on close outpatient follow-up. She is encouraged to increase her fluid intake. She agrees to continue to take OTC Tylenol or ibuprofen for pain, fever, or body aches. She agrees to follow-up with her PCP as directed. She agrees return for high fever, incessant vomiting, severe pain, any other worsening symptoms. She is discharged home in stable condition. FINAL IMPRESSION      1.  Pneumonia due to COVID-19 virus          DISPOSITION/PLAN   DISPOSITION        PATIENT REFERRED TO:  Charlotte Rushing MD  [address] [facility]  [phone]    In 2 days        DISCHARGE MEDICATIONS:  Discharge Medication List as of 11/30/2021  8:31 PM          DISCONTINUED MEDICATIONS:  Discharge Medication List as of 11/30/2021  8:31 PM                 (Please note that portions of this note were completed with a voice recognition program.  Efforts were made to edit the dictations but occasionally words are mis-transcribed.)    DAO Levin CNP (electronically signed)            DAO Levin CNP  12/01/21 0003

## 2021-11-30 NOTE — ED NOTES
Lab called regarding D-dimer still unresulted. They just received the tube from main processing and are now running D. Dimer. Maurilio Bender NP made aware.       Anastacia Hinds RN  11/30/21 1819

## 2021-12-01 ENCOUNTER — CARE COORDINATION (OUTPATIENT)
Dept: CARE COORDINATION | Age: 26
End: 2021-12-01

## 2021-12-01 LAB
EKG ATRIAL RATE: 130 BPM
EKG DIAGNOSIS: NORMAL
EKG P AXIS: 28 DEGREES
EKG P-R INTERVAL: 116 MS
EKG Q-T INTERVAL: 312 MS
EKG QRS DURATION: 82 MS
EKG QTC CALCULATION (BAZETT): 459 MS
EKG R AXIS: 58 DEGREES
EKG T AXIS: 23 DEGREES
EKG VENTRICULAR RATE: 130 BPM

## 2021-12-01 PROCEDURE — 93010 ELECTROCARDIOGRAM REPORT: CPT | Performed by: INTERNAL MEDICINE

## 2021-12-01 NOTE — CARE COORDINATION
Patient contacted regarding COVID-19 diagnosis. Discussed COVID-19 related testing which was available at this time. Test results were positive. Patient informed of results, if available? Yes. Ambulatory Care Manager contacted the patient by telephone to perform post discharge assessment. Call within 2 business days of discharge: Yes. Verified name and  with patient as identifiers. Provided introduction to self, and explanation of the CTN/ACM role, and reason for call due to risk factors for infection and/or exposure to COVID-19. Symptoms reviewed with patient who verbalized the following symptoms: fever, fatigue, cough, shortness of breath, chills or shaking, fast heart rate, dizziness/lightheadedness and sore throat. .      Due to no new or worsening symptoms encounter was not routed to provider for escalation. Discussed follow-up appointments. If no appointment was previously scheduled, appointment scheduling offered: Yes. [facility] follow up appointment(s): No future appointments. Non-[facility] follow up appointment(s): none. Non-face-to-face services provided:  Obtained and reviewed discharge summary and/or continuity of care documents  Education of patient/family/caregiver/guardian to support self-management-for COVID 19. Advance Care Planning:   Does patient have an Advance Directive:  not on file; education provided. Educated patient about risk for severe COVID-19 due to risk factors according to CDC guidelines. ACM reviewed discharge instructions, medical action plan and red flag symptoms with the patient who verbalized understanding. Discussed COVID vaccination status: Yes. Education provided on COVID-19 vaccination as appropriate. Discussed exposure protocols and quarantine with CDC Guidelines. Patient was given an opportunity to verbalize any questions and concerns and agrees to contact ACM or health care provider for questions related to their healthcare.     Reviewed and educated patient on any new and changed medications related to discharge diagnosis     Was patient discharged with a pulse oximeter? No Discussed and confirmed pulse oximeter discharge instructions and when to notify provider or seek emergency care. ACM provided contact information. Plan for follow-up call in 1-2 days based on severity of symptoms and risk factors.

## 2021-12-01 NOTE — ED NOTES
Patient ambulated at this time, o2 remained 94-96% on RA. Patient denies SOB or dizziness.       Brian Lassiter RN  11/30/21 2025

## 2021-12-01 NOTE — ED NOTES
Report given to [facility], [address], RN  11/30/21 1507

## 2021-12-01 NOTE — ED NOTES
Provider order placed for patient's discharge. Provider reviewed decision to discharge with the patient. Discharge paperwork and any prescriptions were reviewed with the patient. Patient verbalized understanding of discharge education and any prescriptions and has no further questions or further needs at this time. Patient left with all personal belongings and was stable upon departure. Patient thanked for choosing [facility] and informed to return should any need arise.        Conner Deal RN  11/30/21 4311

## 2021-12-03 ENCOUNTER — CARE COORDINATION (OUTPATIENT)
Dept: CARE COORDINATION | Age: 26
End: 2021-12-03

## 2021-12-03 NOTE — CARE COORDINATION
Patient contacted regarding COVID-19 diagnosis. Discussed COVID-19 related testing which was available at this time. Test results were positive. Patient informed of results, if available? Yes    Ambulatory Care Manager contacted the patient by telephone to perform follow-up assessment. Verified name and  with patient as identifiers. Patient has following risk factors of: no known risk factors. Symptoms reviewed with patient who verbalized the following symptoms: fever, fatigue, pain or aching joints, cough, shortness of breath, chills or shaking, nausea, vomiting, fast heart rate, dizziness/lightheadedness and sore throat and headache. .   Due to no new or worsening symptoms encounter was not routed to provider for escalation. Educated patient about risk for severe COVID-19 due to risk factors according to CDC guidelines. ACM reviewed discharge instructions, medical action plan and red flag symptoms with the patient who verbalized understanding. Discussed COVID vaccination status: Yes. Education provided on COVID-19 vaccination as appropriate. Discussed exposure protocols and quarantine with CDC Guidelines. Patient was given an opportunity to verbalize any questions and concerns and agrees to contact ACM or health care provider for questions related to their healthcare. Was patient discharged with a pulse oximeter? No Discussed and confirmed pulse oximeter discharge instructions and when to notify provider or seek emergency care. ACM provided contact information. Plan for follow-up call in 3-5 days based on severity of symptoms and risk factors. Patient stated that she feels better. The patient said she still has a headache that she can't seem to get rid of. This ACM advised her to increase her fluid intake. The patient has a f/u appointment with her PCP for 2021.

## 2021-12-04 LAB — BLOOD CULTURE, ROUTINE: NORMAL

## 2021-12-08 ENCOUNTER — CARE COORDINATION (OUTPATIENT)
Dept: CARE COORDINATION | Age: 26
End: 2021-12-08